# Patient Record
Sex: FEMALE | Employment: OTHER
[De-identification: names, ages, dates, MRNs, and addresses within clinical notes are randomized per-mention and may not be internally consistent; named-entity substitution may affect disease eponyms.]

---

## 2017-03-10 RX ORDER — METOPROLOL TARTRATE 100 MG/1
100 TABLET ORAL 2 TIMES DAILY
COMMUNITY
End: 2019-06-07

## 2017-03-13 ENCOUNTER — SURGERY (OUTPATIENT)
Age: 77
End: 2017-03-13

## 2017-03-13 ENCOUNTER — ANESTHESIA (OUTPATIENT)
Dept: ENDOSCOPY | Age: 77
End: 2017-03-13
Payer: MEDICARE

## 2017-03-13 ENCOUNTER — ANESTHESIA EVENT (OUTPATIENT)
Dept: ENDOSCOPY | Age: 77
End: 2017-03-13
Payer: MEDICARE

## 2017-03-13 ENCOUNTER — HOSPITAL ENCOUNTER (OUTPATIENT)
Age: 77
Setting detail: OUTPATIENT SURGERY
Discharge: HOME | End: 2017-03-13
Attending: SPECIALIST | Admitting: SPECIALIST
Payer: MEDICARE

## 2017-03-13 VITALS
DIASTOLIC BLOOD PRESSURE: 65 MMHG | TEMPERATURE: 97.7 F | RESPIRATION RATE: 27 BRPM | SYSTOLIC BLOOD PRESSURE: 116 MMHG | WEIGHT: 173 LBS | OXYGEN SATURATION: 97 % | HEART RATE: 72 BPM | BODY MASS INDEX: 31.64 KG/M2

## 2017-03-13 PROCEDURE — 77030009426 HC FCPS BIOP ENDOSC BSC -B: Performed by: SPECIALIST

## 2017-03-13 PROCEDURE — 88305 TISSUE EXAM BY PATHOLOGIST: CPT | Performed by: SPECIALIST

## 2017-03-13 PROCEDURE — 76060000031 HC ANESTHESIA FIRST 0.5 HR: Performed by: SPECIALIST

## 2017-03-13 PROCEDURE — 74011000250 HC RX REV CODE- 250

## 2017-03-13 PROCEDURE — 74011250636 HC RX REV CODE- 250/636

## 2017-03-13 PROCEDURE — 76040000019: Performed by: SPECIALIST

## 2017-03-13 RX ORDER — PROPOFOL 10 MG/ML
INJECTION, EMULSION INTRAVENOUS AS NEEDED
Status: DISCONTINUED | OUTPATIENT
Start: 2017-03-13 | End: 2017-03-13 | Stop reason: HOSPADM

## 2017-03-13 RX ORDER — FENTANYL CITRATE 50 UG/ML
200 INJECTION, SOLUTION INTRAMUSCULAR; INTRAVENOUS
Status: DISCONTINUED | OUTPATIENT
Start: 2017-03-13 | End: 2017-03-13 | Stop reason: HOSPADM

## 2017-03-13 RX ORDER — SODIUM CHLORIDE 0.9 % (FLUSH) 0.9 %
5-10 SYRINGE (ML) INJECTION AS NEEDED
Status: DISCONTINUED | OUTPATIENT
Start: 2017-03-13 | End: 2017-03-13 | Stop reason: HOSPADM

## 2017-03-13 RX ORDER — OMEPRAZOLE 20 MG/1
20 CAPSULE, DELAYED RELEASE ORAL DAILY
Qty: 30 CAP | Refills: 1 | Status: SHIPPED | OUTPATIENT
Start: 2017-03-13 | End: 2019-06-07

## 2017-03-13 RX ORDER — DEXTROMETHORPHAN/PSEUDOEPHED 2.5-7.5/.8
1.2 DROPS ORAL
Status: DISCONTINUED | OUTPATIENT
Start: 2017-03-13 | End: 2017-03-13 | Stop reason: HOSPADM

## 2017-03-13 RX ORDER — SODIUM CHLORIDE 9 MG/ML
50 INJECTION, SOLUTION INTRAVENOUS CONTINUOUS
Status: DISCONTINUED | OUTPATIENT
Start: 2017-03-13 | End: 2017-03-13 | Stop reason: HOSPADM

## 2017-03-13 RX ORDER — MIDAZOLAM HYDROCHLORIDE 1 MG/ML
.25-1 INJECTION, SOLUTION INTRAMUSCULAR; INTRAVENOUS
Status: DISCONTINUED | OUTPATIENT
Start: 2017-03-13 | End: 2017-03-13 | Stop reason: HOSPADM

## 2017-03-13 RX ORDER — SODIUM CHLORIDE 9 MG/ML
INJECTION, SOLUTION INTRAVENOUS
Status: DISCONTINUED | OUTPATIENT
Start: 2017-03-13 | End: 2017-03-13 | Stop reason: HOSPADM

## 2017-03-13 RX ORDER — SODIUM CHLORIDE 0.9 % (FLUSH) 0.9 %
5-10 SYRINGE (ML) INJECTION EVERY 8 HOURS
Status: DISCONTINUED | OUTPATIENT
Start: 2017-03-13 | End: 2017-03-13 | Stop reason: HOSPADM

## 2017-03-13 RX ORDER — LIDOCAINE HYDROCHLORIDE 20 MG/ML
INJECTION, SOLUTION EPIDURAL; INFILTRATION; INTRACAUDAL; PERINEURAL AS NEEDED
Status: DISCONTINUED | OUTPATIENT
Start: 2017-03-13 | End: 2017-03-13 | Stop reason: HOSPADM

## 2017-03-13 RX ORDER — FLUMAZENIL 0.1 MG/ML
0.2 INJECTION INTRAVENOUS
Status: DISCONTINUED | OUTPATIENT
Start: 2017-03-13 | End: 2017-03-13 | Stop reason: HOSPADM

## 2017-03-13 RX ORDER — EPINEPHRINE 0.1 MG/ML
1 INJECTION INTRACARDIAC; INTRAVENOUS
Status: DISCONTINUED | OUTPATIENT
Start: 2017-03-13 | End: 2017-03-13 | Stop reason: HOSPADM

## 2017-03-13 RX ORDER — NALOXONE HYDROCHLORIDE 0.4 MG/ML
0.4 INJECTION, SOLUTION INTRAMUSCULAR; INTRAVENOUS; SUBCUTANEOUS
Status: DISCONTINUED | OUTPATIENT
Start: 2017-03-13 | End: 2017-03-13 | Stop reason: HOSPADM

## 2017-03-13 RX ORDER — ATROPINE SULFATE 0.1 MG/ML
0.5 INJECTION INTRAVENOUS
Status: DISCONTINUED | OUTPATIENT
Start: 2017-03-13 | End: 2017-03-13 | Stop reason: HOSPADM

## 2017-03-13 RX ORDER — LORAZEPAM 2 MG/ML
2 INJECTION INTRAMUSCULAR AS NEEDED
Status: DISCONTINUED | OUTPATIENT
Start: 2017-03-13 | End: 2017-03-13 | Stop reason: HOSPADM

## 2017-03-13 RX ADMIN — PROPOFOL 20 MG: 10 INJECTION, EMULSION INTRAVENOUS at 10:35

## 2017-03-13 RX ADMIN — PROPOFOL 20 MG: 10 INJECTION, EMULSION INTRAVENOUS at 10:40

## 2017-03-13 RX ADMIN — PROPOFOL 20 MG: 10 INJECTION, EMULSION INTRAVENOUS at 10:37

## 2017-03-13 RX ADMIN — PROPOFOL 20 MG: 10 INJECTION, EMULSION INTRAVENOUS at 10:42

## 2017-03-13 RX ADMIN — PROPOFOL 50 MG: 10 INJECTION, EMULSION INTRAVENOUS at 10:34

## 2017-03-13 RX ADMIN — SODIUM CHLORIDE: 9 INJECTION, SOLUTION INTRAVENOUS at 10:22

## 2017-03-13 RX ADMIN — LIDOCAINE HYDROCHLORIDE 20 MG: 20 INJECTION, SOLUTION EPIDURAL; INFILTRATION; INTRACAUDAL; PERINEURAL at 10:34

## 2017-03-13 NOTE — ANESTHESIA PREPROCEDURE EVALUATION
Anesthetic History   No history of anesthetic complications            Review of Systems / Medical History  Patient summary reviewed, nursing notes reviewed and pertinent labs reviewed    Pulmonary  Within defined limits                 Neuro/Psych   Within defined limits           Cardiovascular  Within defined limits  Hypertension                   GI/Hepatic/Renal  Within defined limits              Endo/Other  Within defined limits  Diabetes         Other Findings              Physical Exam    Airway  Mallampati: II  TM Distance: > 6 cm  Neck ROM: normal range of motion   Mouth opening: Normal     Cardiovascular  Regular rate and rhythm,  S1 and S2 normal,  no murmur, click, rub, or gallop             Dental  No notable dental hx       Pulmonary  Breath sounds clear to auscultation               Abdominal  GI exam deferred       Other Findings            Anesthetic Plan    ASA: 2  Anesthesia type: MAC          Induction: Intravenous  Anesthetic plan and risks discussed with: Patient

## 2017-03-13 NOTE — IP AVS SNAPSHOT
[address] [facility] 245 [phone] Patient: Abdon Andujar MRN: [id number] RJT:[id number] You are allergic to the following Allergen Reactions Fosamax (Alendronate) Other (comments) Esophageal spasm/difficulty swallowing - possibility the medication caused this. Recent Documentation Weight BMI OB Status Smoking Status 78.5 kg 31.64 kg/m2 Postmenopausal Never Smoker Emergency Contacts Name Discharge Info Relation Home Work Mobile Tee Bentley  Child [2] [id number] About your hospitalization You were admitted on:  March 13, 2017 You last received care in the:  [facility] ENDOSCOPY You were discharged on:  March 13, 2017 Unit phone number:  [phone] Why you were hospitalized Your primary diagnosis was:  Not on File Providers Seen During Your Hospitalizations Provider Role Specialty Primary office phone Law Tellez MD Attending Provider Gastroenterology [phone] Your Primary Care Physician (PCP) Primary Care Physician Office Phone Office Fax Sherlean Boast [phone] Follow-up Information None Current Discharge Medication List  
  
START taking these medications Dose & Instructions Dispensing Information Comments Morning Noon Evening Bedtime  
 omeprazole 20 mg capsule Commonly known as:  PRILOSEC Your last dose was: Your next dose is: Other:  _________ Dose:  20 mg Take 1 Cap by mouth daily. Quantity:  30 Cap Refills:  1 CONTINUE these medications which have NOT CHANGED Dose & Instructions Dispensing Information Comments Morning Noon Evening Bedtime ALLEGRA 180 mg tablet Generic drug:  fexofenadine Your last dose was: Your next dose is: Other:  _________  Dose:  180 mg  
 Take 180 mg by mouth daily. Refills:  0  
     
   
   
   
  
 aspirin 81 mg tablet Your last dose was: Your next dose is: Other:  _________ Dose:  81 mg Take 81 mg by mouth daily. Refills:  0  
     
   
   
   
  
 BIOTIN PO Your last dose was: Your next dose is: Other:  _________ Dose:  5000 mcg Take 5,000 mcg by mouth two (2) times a day. Refills:  0  
     
   
   
   
  
 CALCIUM + D PO Your last dose was: Your next dose is: Other:  _________ Dose:  600 mg Take 600 mg by mouth two (2) times a day. 600mg/400 units Refills:  0  
     
   
   
   
  
 CALCIUM CITRATE + D PO Your last dose was: Your next dose is: Other:  _________ Take  by mouth. Takes one po once daily. Refills:  0  
     
   
   
   
  
 CRESTOR 20 mg tablet Generic drug:  rosuvastatin Your last dose was: Your next dose is: Other:  _________ Dose:  20 mg Take 20 mg by mouth daily. Refills:  0  
     
   
   
   
  
 finasteride 5 mg tablet Commonly known as:  PROSCAR Your last dose was: Your next dose is: Other:  _________ Dose:  5 mg Take 5 mg by mouth daily. Refills:  0  
     
   
   
   
  
 hydroCHLOROthiazide 25 mg tablet Commonly known as:  HYDRODIURIL Your last dose was: Your next dose is: Other:  _________ Dose:  25 mg Take 25 mg by mouth daily. Refills:  0 LOVAZA 1 gram capsule Generic drug:  omega-3 acid ethyl esters Your last dose was: Your next dose is: Other:  _________ Dose:  1 g Take 1 g by mouth two (2) times a day. Refills:  0  
     
   
   
   
  
 magnesium 250 mg Tab Your last dose was: Your next dose is: Other:  _________  Dose:  250 mg  
 Take 250 mg by mouth daily. Refills:  0  
     
   
   
   
  
 metoprolol tartrate 100 mg IR tablet Commonly known as:  LOPRESSOR Your last dose was: Your next dose is: Other:  _________ Dose:  100 mg Take 100 mg by mouth two (2) times a day. Refills:  0  
     
   
   
   
  
 OCUVITE PO Your last dose was: Your next dose is: Other:  _________ Take  by mouth. Takes one po twice daily. Refills:  0 Where to Get Your Medications Information on where to get these meds will be given to you by the nurse or doctor. ! Ask your nurse or doctor about these medications  
  omeprazole 20 mg capsule Discharge Instructions Charleen Bess 
[id number] 
1940 EGD DISCHARGE INSTRUCTIONS Discomfort: 
Sore throat- throat lozenges or warm salt water gargle 
redness at IV site- apply warm compress to area; if redness or soreness persist- contact your physician Gaseous discomfort- walking, belching will help relieve any discomfort You may not operate a vehicle for 12 hours You may not engage in an occupation involving machinery or appliances for rest of today. You may not drink alcoholic beverages for at least 12 hours Avoid making any critical decisions for at least 24 hour DIET You may resume your regular diet  however -  remember your colon is empty and a heavy meal will produce gas. Avoid these foods:  vegetables, fried / greasy foods, carbonated drinks ACTIVITY You may resume your normal daily activities. Spend the remainder of the day resting -  avoid any strenuous activity. CALL M.D. ANY SIGN OF Increasing pain, nausea, vomiting Abdominal distension (swelling) New increased bleeding (oral or rectal) Fever (chills) Pain in chest area Bloody discharge from nose or mouth Shortness of breath You may not take any Advil, Aspirin, Ibuprofen, Motrin, Aleve, or Goodys unless MD recommended, ONLY  Tylenol as needed for pain. Follow-up Instructions: 
 Call Dr. Katrina Alas office to make appointment for ongoing problems Office telephone for problems or questions [phone] Results of procedure / biopsy in 10-14 days  
-Acid suppression with a proton pump inhibitor take omeprazole for 30 days. , -Await pathology. , -Follow up with me for ongoing symptoms. , -No NSAIDS Discharge Orders None Introducing \[facility]\"" & HEALTH SERVICES! Dear Tucker Holley: 
Thank you for requesting a VUELOGIC account. Our records indicate that you already have an active VUELOGIC account. You can access your account anytime at [email or web address] Did you know that you can access your hospital and ER discharge instructions at any time in VUELOGIC? You can also review all of your test results from your hospital stay or ER visit. Additional Information If you have questions, please visit the Frequently Asked Questions section of the VUELOGIC website at [email or web address] Remember, VUELOGIC is NOT to be used for urgent needs. For medical emergencies, dial 911. Now available from your iPhone and Android! General Information Please provide this summary of care documentation to your next provider. Patient Signature:  ____________________________________________________________ Date:  ____________________________________________________________  
  
Yoel Barbosa Provider Signature:  ____________________________________________________________ Date:  ____________________________________________________________

## 2017-03-13 NOTE — DISCHARGE INSTRUCTIONS
Foy Osler  [id number]  1940    EGD DISCHARGE INSTRUCTIONS  Discomfort:  Sore throat- throat lozenges or warm salt water gargle  redness at IV site- apply warm compress to area; if redness or soreness persist- contact your physician  Gaseous discomfort- walking, belching will help relieve any discomfort  You may not operate a vehicle for 12 hours  You may not engage in an occupation involving machinery or appliances for rest of today. You may not drink alcoholic beverages for at least 12 hours  Avoid making any critical decisions for at least 24 hour  DIET  You may resume your regular diet - however -  remember your colon is empty and a heavy meal will produce gas. Avoid these foods:  vegetables, fried / greasy foods, carbonated drinks  ACTIVITY  You may resume your normal daily activities. Spend the remainder of the day resting -  avoid any strenuous activity. CALL M.D. ANY SIGN OF   Increasing pain, nausea, vomiting  Abdominal distension (swelling)  New increased bleeding (oral or rectal)  Fever (chills)  Pain in chest area  Bloody discharge from nose or mouth  Shortness of breath    You may not take any Advil, Aspirin, Ibuprofen, Motrin, Aleve, or Goodys unless MD recommended, ONLY  Tylenol as needed for pain. Follow-up Instructions:   Call Dr. Live Falling office to make appointment for ongoing problems  Office telephone for problems or questions [phone]  Results of procedure / biopsy in 10-14 days   -Acid suppression with a proton pump inhibitor take omeprazole for 30 days. , -Await pathology. , -Follow up with me for ongoing symptoms. , -No NSAIDS

## 2017-03-13 NOTE — PROCEDURES
EGD Procedure Note    Indications:  Dyphagia/odynophagia, GERD, Hx of Fosamax use   Referring Physician: Tai Edge MD   Anesthesia/Sedation:MAC  Endoscopist:  Dr. Rebecca Liu  Assistant:  Endoscopy Technician-1: Jains Son  Endoscopy RN-1: Xiomy Anders RN    Preoperative diagnosis: GERD    Postoperative diagnosis: Hiatal Hernia  Schatzki's Ring  Esophagitis      Procedure in Detail:  Informed consent was obtained for the procedure, including sedation. Risks of perforation, hemorrhage, adverse drug reaction, and aspiration were discussed. The patient was placed in the left lateral decubitus position. Based on the pre-procedure assessment, including review of the patient's medical history, medications, allergies, and review of systems, she had been deemed to be an appropriate candidate for moderate sedation; she was therefore sedated with the medications listed above. The patient was monitored continuously with ECG tracing, pulse oximetry, blood pressure monitoring, and direct observations. An Olympus video endoscope was inserted into the mouth and advanced under direct vision to into the esophagus, then stomach and duodenum. A careful inspection was made as the gastroscope was withdrawn, including a retroflexed view of the proximal stomach; findings and interventions are described below. Findings:   Esophagus:focal erosion at GEJ -Schatzki's ring(13 mm) - OTG Savory dilation -45-48 [place]  Stomach: 4 cm fixed hiatal hernia  Duodenum/jejunum: normal    Therapies:  See above    Specimens: See above           EBL: None    Complications:   None; patient tolerated the procedure well. Recommendations:  -Acid suppression with a proton pump inhibitor take omeprazole for 30 days. , -Await pathology. , -Follow up with me for ongoing symptoms. , -No NSAIDS    Katherin Mares MD

## 2017-03-13 NOTE — ROUTINE PROCESS
[facility]  1940  [id number]    Situation:  Verbal report received from: VERONICA Ingram RN  Procedure: Procedure(s):  ESOPHAGOGASTRODUODENOSCOPY (EGD)  ESOPHAGEAL DILATION  ESOPHAGOGASTRODUODENAL (EGD) BIOPSY    Background:    Preoperative diagnosis: GERD  Postoperative diagnosis: Hiatal Hernia  Schatzki's Ring  Esophagitis    :  Dr. Lane Abebe  Assistant(s): Endoscopy Technician-1: Chau Barrow  Endoscopy RN-1: Isauro Gardner RN    Specimens:   ID Type Source Tests Collected by Time Destination   1 : GE junction Preservative Esophagus, Distal  Jane Rowland MD 3/13/2017 1038 Pathology     H. Pylori  no    Assessment:  Intra-procedure medications     Anesthesia gave intra-procedure sedation and medications, see anesthesia flow sheet yes    Intravenous fluids: NS@ KVO     Vital signs stable     Abdominal assessment: round and soft     Recommendation:  Discharge patient per MD order.   Family or Friend   Permission to share finding with family or friend yes

## 2017-03-13 NOTE — ANESTHESIA POSTPROCEDURE EVALUATION
Post-Anesthesia Evaluation and Assessment    Patient: Melinda Mar MRN: [id number]  SSN: [id number]    YOB: 1940  Age: 64 y.o. Sex: female       Cardiovascular Function/Vital Signs  Visit Vitals    BP (!) 116/98    Pulse 80    Temp 37 °C (98.6 °F)    Resp 20    Wt 78.5 kg (173 lb)    SpO2 94%    BMI 31.64 kg/m2       Patient is status post MAC anesthesia for Procedure(s):  ESOPHAGOGASTRODUODENOSCOPY (EGD)  ESOPHAGEAL DILATION  ESOPHAGOGASTRODUODENAL (EGD) BIOPSY. Nausea/Vomiting: None    Postoperative hydration reviewed and adequate. Pain:  Pain Scale 1: Numeric (0 - 10) (03/13/17 1001)  Pain Intensity 1: 0 (03/13/17 1001)   Managed    Neurological Status: At baseline    Mental Status and Level of Consciousness: Arousable    Pulmonary Status:   O2 Device: Nasal cannula (03/13/17 1045)   Adequate oxygenation and airway patent    Complications related to anesthesia: None    Post-anesthesia assessment completed.  No concerns    Signed By: Darry Bumpers, MD     March 13, 2017

## 2017-08-30 ENCOUNTER — HOSPITAL ENCOUNTER (OUTPATIENT)
Dept: NON INVASIVE DIAGNOSTICS | Age: 77
Discharge: HOME | End: 2017-08-30
Payer: MEDICARE

## 2017-08-30 DIAGNOSIS — Z01.818 PRE-OP EXAMINATION: ICD-10-CM

## 2017-08-30 PROCEDURE — 93005 ELECTROCARDIOGRAM TRACING: CPT

## 2017-08-31 LAB
ATRIAL RATE: 63 BPM
CALCULATED P AXIS, ECG09: 65 DEGREES
CALCULATED R AXIS, ECG10: 21 DEGREES
CALCULATED T AXIS, ECG11: 43 DEGREES
DIAGNOSIS, 93000: NORMAL
P-R INTERVAL, ECG05: 198 MS
Q-T INTERVAL, ECG07: 402 MS
QRS DURATION, ECG06: 82 MS
QTC CALCULATION (BEZET), ECG08: 411 MS
VENTRICULAR RATE, ECG03: 63 BPM

## 2019-06-07 ENCOUNTER — APPOINTMENT (OUTPATIENT)
Dept: CT IMAGING | Age: 79
DRG: 872 | End: 2019-06-07
Attending: EMERGENCY MEDICINE
Payer: MEDICARE

## 2019-06-07 ENCOUNTER — HOSPITAL ENCOUNTER (INPATIENT)
Age: 79
LOS: 3 days | Discharge: HOME | DRG: 872 | End: 2019-06-10
Attending: EMERGENCY MEDICINE | Admitting: FAMILY MEDICINE
Payer: MEDICARE

## 2019-06-07 DIAGNOSIS — R50.9 FEVER, UNSPECIFIED FEVER CAUSE: ICD-10-CM

## 2019-06-07 DIAGNOSIS — R10.32 ABDOMINAL PAIN, LLQ (LEFT LOWER QUADRANT): Primary | ICD-10-CM

## 2019-06-07 PROBLEM — K52.9 COLITIS: Status: ACTIVE | Noted: 2019-06-07

## 2019-06-07 LAB
ALBUMIN SERPL-MCNC: 3.2 G/DL (ref 3.5–5)
ALBUMIN/GLOB SERPL: 0.8 {RATIO} (ref 1.1–2.2)
ALP SERPL-CCNC: 115 U/L (ref 45–117)
ALT SERPL-CCNC: 22 U/L (ref 12–78)
ANION GAP SERPL CALC-SCNC: 8 MMOL/L (ref 5–15)
APPEARANCE UR: CLEAR
AST SERPL-CCNC: 12 U/L (ref 15–37)
BASOPHILS # BLD: 0 K/UL (ref 0–0.1)
BASOPHILS NFR BLD: 0 % (ref 0–1)
BILIRUB SERPL-MCNC: 0.5 MG/DL (ref 0.2–1)
BILIRUB UR QL: NEGATIVE
BUN SERPL-MCNC: 25 MG/DL (ref 6–20)
BUN/CREAT SERPL: 28 (ref 12–20)
CALCIUM SERPL-MCNC: 9.2 MG/DL (ref 8.5–10.1)
CHLORIDE SERPL-SCNC: 106 MMOL/L (ref 97–108)
CO2 SERPL-SCNC: 24 MMOL/L (ref 21–32)
COLOR UR: NORMAL
COMMENT, HOLDF: NORMAL
CREAT SERPL-MCNC: 0.9 MG/DL (ref 0.55–1.02)
DIFFERENTIAL METHOD BLD: ABNORMAL
EOSINOPHIL # BLD: 0 K/UL (ref 0–0.4)
EOSINOPHIL NFR BLD: 0 % (ref 0–7)
ERYTHROCYTE [DISTWIDTH] IN BLOOD BY AUTOMATED COUNT: 14.7 % (ref 11.5–14.5)
EST. AVERAGE GLUCOSE BLD GHB EST-MCNC: 117 MG/DL
GLOBULIN SER CALC-MCNC: 3.8 G/DL (ref 2–4)
GLUCOSE BLD STRIP.AUTO-MCNC: 122 MG/DL (ref 65–100)
GLUCOSE BLD STRIP.AUTO-MCNC: 136 MG/DL (ref 65–100)
GLUCOSE SERPL-MCNC: 133 MG/DL (ref 65–100)
GLUCOSE UR STRIP.AUTO-MCNC: NEGATIVE MG/DL
HBA1C MFR BLD: 5.7 % (ref 4.2–6.3)
HCT VFR BLD AUTO: 39.1 % (ref 35–47)
HGB BLD-MCNC: 12.9 G/DL (ref 11.5–16)
HGB UR QL STRIP: NEGATIVE
IMM GRANULOCYTES # BLD AUTO: 0.3 K/UL
IMM GRANULOCYTES NFR BLD AUTO: 1 %
KETONES UR QL STRIP.AUTO: NEGATIVE MG/DL
LACTATE SERPL-SCNC: 1.7 MMOL/L (ref 0.4–2)
LACTATE SERPL-SCNC: 2.2 MMOL/L (ref 0.4–2)
LACTATE SERPL-SCNC: 2.5 MMOL/L (ref 0.4–2)
LEUKOCYTE ESTERASE UR QL STRIP.AUTO: NEGATIVE
LIPASE SERPL-CCNC: 81 U/L (ref 73–393)
LYMPHOCYTES # BLD: 1.1 K/UL (ref 0.8–3.5)
LYMPHOCYTES NFR BLD: 4 % (ref 12–49)
MCH RBC QN AUTO: 27.4 PG (ref 26–34)
MCHC RBC AUTO-ENTMCNC: 33 G/DL (ref 30–36.5)
MCV RBC AUTO: 83.2 FL (ref 80–99)
MONOCYTES # BLD: 0.9 K/UL (ref 0–1)
MONOCYTES NFR BLD: 3 % (ref 5–13)
NEUTS SEG # BLD: 26.4 K/UL (ref 1.8–8)
NEUTS SEG NFR BLD: 92 % (ref 32–75)
NITRITE UR QL STRIP.AUTO: NEGATIVE
NRBC # BLD: 0 K/UL (ref 0–0.01)
NRBC BLD-RTO: 0 PER 100 WBC
PH UR STRIP: 6.5 [PH] (ref 5–8)
PLATELET # BLD AUTO: 328 K/UL (ref 150–400)
PMV BLD AUTO: 9.8 FL (ref 8.9–12.9)
POTASSIUM SERPL-SCNC: 3.4 MMOL/L (ref 3.5–5.1)
PROT SERPL-MCNC: 7 G/DL (ref 6.4–8.2)
PROT UR STRIP-MCNC: NEGATIVE MG/DL
RBC # BLD AUTO: 4.7 M/UL (ref 3.8–5.2)
RBC MORPH BLD: ABNORMAL
SAMPLES BEING HELD,HOLD: NORMAL
SERVICE CMNT-IMP: ABNORMAL
SERVICE CMNT-IMP: ABNORMAL
SODIUM SERPL-SCNC: 138 MMOL/L (ref 136–145)
SP GR UR REFRACTOMETRY: <1.005 (ref 1–1.03)
TROPONIN I SERPL-MCNC: <0.05 NG/ML
UR CULT HOLD, URHOLD: NORMAL
UROBILINOGEN UR QL STRIP.AUTO: 0.2 EU/DL (ref 0.2–1)
WBC # BLD AUTO: 28.7 K/UL (ref 3.6–11)

## 2019-06-07 PROCEDURE — 85025 COMPLETE CBC W/AUTO DIFF WBC: CPT

## 2019-06-07 PROCEDURE — 74177 CT ABD & PELVIS W/CONTRAST: CPT

## 2019-06-07 PROCEDURE — 94760 N-INVAS EAR/PLS OXIMETRY 1: CPT

## 2019-06-07 PROCEDURE — 81003 URINALYSIS AUTO W/O SCOPE: CPT

## 2019-06-07 PROCEDURE — 83036 HEMOGLOBIN GLYCOSYLATED A1C: CPT

## 2019-06-07 PROCEDURE — 74011636320 HC RX REV CODE- 636/320: Performed by: EMERGENCY MEDICINE

## 2019-06-07 PROCEDURE — 65660000000 HC RM CCU STEPDOWN

## 2019-06-07 PROCEDURE — 74011250637 HC RX REV CODE- 250/637: Performed by: EMERGENCY MEDICINE

## 2019-06-07 PROCEDURE — 36415 COLL VENOUS BLD VENIPUNCTURE: CPT

## 2019-06-07 PROCEDURE — 94761 N-INVAS EAR/PLS OXIMETRY MLT: CPT

## 2019-06-07 PROCEDURE — 74011000250 HC RX REV CODE- 250: Performed by: FAMILY MEDICINE

## 2019-06-07 PROCEDURE — 80053 COMPREHEN METABOLIC PANEL: CPT

## 2019-06-07 PROCEDURE — 83690 ASSAY OF LIPASE: CPT

## 2019-06-07 PROCEDURE — 74011000258 HC RX REV CODE- 258: Performed by: EMERGENCY MEDICINE

## 2019-06-07 PROCEDURE — 99285 EMERGENCY DEPT VISIT HI MDM: CPT

## 2019-06-07 PROCEDURE — 87040 BLOOD CULTURE FOR BACTERIA: CPT

## 2019-06-07 PROCEDURE — 83605 ASSAY OF LACTIC ACID: CPT

## 2019-06-07 PROCEDURE — 74011250636 HC RX REV CODE- 250/636: Performed by: EMERGENCY MEDICINE

## 2019-06-07 PROCEDURE — 84484 ASSAY OF TROPONIN QUANT: CPT

## 2019-06-07 PROCEDURE — 96365 THER/PROPH/DIAG IV INF INIT: CPT

## 2019-06-07 PROCEDURE — 74011250636 HC RX REV CODE- 250/636: Performed by: FAMILY MEDICINE

## 2019-06-07 PROCEDURE — 82962 GLUCOSE BLOOD TEST: CPT

## 2019-06-07 PROCEDURE — C9113 INJ PANTOPRAZOLE SODIUM, VIA: HCPCS | Performed by: FAMILY MEDICINE

## 2019-06-07 RX ORDER — SODIUM CHLORIDE 9 MG/ML
75 INJECTION, SOLUTION INTRAVENOUS CONTINUOUS
Status: DISCONTINUED | OUTPATIENT
Start: 2019-06-07 | End: 2019-06-08

## 2019-06-07 RX ORDER — METOPROLOL SUCCINATE 50 MG/1
100 TABLET, EXTENDED RELEASE ORAL DAILY
Status: DISCONTINUED | OUTPATIENT
Start: 2019-06-08 | End: 2019-06-10 | Stop reason: HOSPADM

## 2019-06-07 RX ORDER — MAGNESIUM SULFATE 100 %
4 CRYSTALS MISCELLANEOUS AS NEEDED
Status: DISCONTINUED | OUTPATIENT
Start: 2019-06-07 | End: 2019-06-09

## 2019-06-07 RX ORDER — MULTIVITAMIN
1 TABLET ORAL DAILY
COMMUNITY

## 2019-06-07 RX ORDER — SODIUM CHLORIDE 0.9 % (FLUSH) 0.9 %
5-40 SYRINGE (ML) INJECTION EVERY 8 HOURS
Status: DISCONTINUED | OUTPATIENT
Start: 2019-06-07 | End: 2019-06-10 | Stop reason: HOSPADM

## 2019-06-07 RX ORDER — INSULIN LISPRO 100 [IU]/ML
INJECTION, SOLUTION INTRAVENOUS; SUBCUTANEOUS EVERY 6 HOURS
Status: DISCONTINUED | OUTPATIENT
Start: 2019-06-07 | End: 2019-06-09

## 2019-06-07 RX ORDER — POTASSIUM CHLORIDE 7.45 MG/ML
10 INJECTION INTRAVENOUS ONCE
Status: COMPLETED | OUTPATIENT
Start: 2019-06-07 | End: 2019-06-07

## 2019-06-07 RX ORDER — METOPROLOL SUCCINATE 100 MG/1
100 TABLET, EXTENDED RELEASE ORAL DAILY
COMMUNITY

## 2019-06-07 RX ORDER — SODIUM CHLORIDE 0.9 % (FLUSH) 0.9 %
5-40 SYRINGE (ML) INJECTION AS NEEDED
Status: DISCONTINUED | OUTPATIENT
Start: 2019-06-07 | End: 2019-06-10 | Stop reason: HOSPADM

## 2019-06-07 RX ORDER — HEPARIN SODIUM 5000 [USP'U]/ML
5000 INJECTION, SOLUTION INTRAVENOUS; SUBCUTANEOUS EVERY 8 HOURS
Status: DISCONTINUED | OUTPATIENT
Start: 2019-06-07 | End: 2019-06-10 | Stop reason: HOSPADM

## 2019-06-07 RX ORDER — GUAIFENESIN 100 MG/5ML
81 LIQUID (ML) ORAL DAILY
Status: DISCONTINUED | OUTPATIENT
Start: 2019-06-08 | End: 2019-06-10 | Stop reason: HOSPADM

## 2019-06-07 RX ORDER — FINASTERIDE 5 MG/1
5 TABLET, FILM COATED ORAL DAILY
Status: DISCONTINUED | OUTPATIENT
Start: 2019-06-08 | End: 2019-06-09

## 2019-06-07 RX ORDER — SODIUM CHLORIDE 0.9 % (FLUSH) 0.9 %
10 SYRINGE (ML) INJECTION
Status: COMPLETED | OUTPATIENT
Start: 2019-06-07 | End: 2019-06-07

## 2019-06-07 RX ORDER — ACETAMINOPHEN 325 MG/1
650 TABLET ORAL
Status: COMPLETED | OUTPATIENT
Start: 2019-06-07 | End: 2019-06-07

## 2019-06-07 RX ORDER — ROSUVASTATIN CALCIUM 10 MG/1
20 TABLET, COATED ORAL DAILY
Status: DISCONTINUED | OUTPATIENT
Start: 2019-06-08 | End: 2019-06-10 | Stop reason: HOSPADM

## 2019-06-07 RX ORDER — ACETAMINOPHEN 325 MG/1
650 TABLET ORAL
Status: DISCONTINUED | OUTPATIENT
Start: 2019-06-07 | End: 2019-06-10 | Stop reason: HOSPADM

## 2019-06-07 RX ORDER — DEXTROSE 50 % IN WATER (D50W) INTRAVENOUS SYRINGE
25-50 AS NEEDED
Status: DISCONTINUED | OUTPATIENT
Start: 2019-06-07 | End: 2019-06-09

## 2019-06-07 RX ADMIN — POTASSIUM CHLORIDE 10 MEQ: 10 INJECTION, SOLUTION INTRAVENOUS at 18:41

## 2019-06-07 RX ADMIN — PIPERACILLIN AND TAZOBACTAM 3.38 G: 3; .375 INJECTION, POWDER, FOR SOLUTION INTRAVENOUS at 13:48

## 2019-06-07 RX ADMIN — SODIUM CHLORIDE 75 ML/HR: 900 INJECTION, SOLUTION INTRAVENOUS at 16:43

## 2019-06-07 RX ADMIN — PIPERACILLIN AND TAZOBACTAM 3.38 G: 3; .375 INJECTION, POWDER, FOR SOLUTION INTRAVENOUS at 21:30

## 2019-06-07 RX ADMIN — HEPARIN SODIUM 5000 UNITS: 5000 INJECTION INTRAVENOUS; SUBCUTANEOUS at 18:50

## 2019-06-07 RX ADMIN — Medication 10 ML: at 12:40

## 2019-06-07 RX ADMIN — SODIUM CHLORIDE 100 ML: 900 INJECTION, SOLUTION INTRAVENOUS at 12:40

## 2019-06-07 RX ADMIN — PANTOPRAZOLE SODIUM 40 MG: 40 INJECTION, POWDER, FOR SOLUTION INTRAVENOUS at 18:41

## 2019-06-07 RX ADMIN — IOPAMIDOL 100 ML: 755 INJECTION, SOLUTION INTRAVENOUS at 12:40

## 2019-06-07 RX ADMIN — ACETAMINOPHEN 650 MG: 325 TABLET ORAL at 10:59

## 2019-06-07 RX ADMIN — SODIUM CHLORIDE 1000 ML: 900 INJECTION, SOLUTION INTRAVENOUS at 10:59

## 2019-06-07 NOTE — ED TRIAGE NOTES
Triage Note: Patient is coming in with nausea and vomiting for the past 2 weeks intermittent. Patient started with fever this morning.

## 2019-06-07 NOTE — PROGRESS NOTES
Problem: Falls - Risk of  Goal: *Absence of Falls  Description  Document Jeanne Lopez Fall Risk and appropriate interventions in the flowsheet.   Outcome: Progressing Towards Goal     Problem: Patient Education: Go to Patient Education Activity  Goal: Patient/Family Education  Outcome: Progressing Towards Goal

## 2019-06-07 NOTE — ROUTINE PROCESS
TRANSFER - IN REPORT: 
 
Verbal report received from Rosa(name) on Breezy Ferris  being received from ED(unit) for routine progression of care Report consisted of patients Situation, Background, Assessment and  
Recommendations(SBAR). Information from the following report(s) SBAR, Kardex, Intake/Output, MAR and Recent Results was reviewed with the receiving nurse. Opportunity for questions and clarification was provided. Assessment completed upon patients arrival to unit and care assumed.

## 2019-06-07 NOTE — ED PROVIDER NOTES
[de-identified] y.o. female with past medical history significant for appendicitis, htn, dm, Meniere's dz, osteoporosis, gerd, breast ca, who presents from home with chief complaint of abd pain. Pt has 2 weeks onset of intermittent abd pain located diffusely in the LLQ, stating \"it's in my lower intestine. \" Her pain initially resolved on its own, but came back last night and was accompanied by multiple episodes of vomiting, cough, and fever (peak 101.3 this AM). Since then, pt has developed a subsequent HA and felt dehydrated. No medications taken. Pt consulted with GI and was referred to the ED for an abd CT. Other complaints include mild, positional right sided chest ache of 2 days. Denies diarrhea, bloody stools, urinary sx, and leg edema. Abd SGHx includes appendectomy. Passing loose BMs. There are no other acute medical concerns at this time. PCP: Lani Gilbert MD    Note written by Natalee Mariscal, as dictated by Karl Murillo MD 10:20 AM    The history is provided by the patient. No  was used.         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       Past Surgical History:   Procedure Laterality Date    BREAST SURGERY PROCEDURE UNLISTED      cyst removed from left breast    HX APPENDECTOMY      HX BREAST BIOPSY Bilateral     x 6 - one cancerous    HX BREAST LUMPECTOMY      left    HX HEENT      T & A    HX OTHER SURGICAL      colonoscopy x 1 - normal per pt    HX VASCULAR ACCESS      port-a-cath in/out         Family History: Problem Relation Age of Onset    Breast Cancer Mother     Heart Disease Father        Social History     Socioeconomic History    Marital status:      Spouse name: Not on file    Number of children: Not on file    Years of education: Not on file    Highest education level: Not on file   Occupational History    Not on file   Social Needs    Financial resource strain: Not on file    Food insecurity:     Worry: Not on file     Inability: Not on file    Transportation needs:     Medical: Not on file     Non-medical: Not on file   Tobacco Use    Smoking status: Never Smoker    Smokeless tobacco: Never Used   Substance and Sexual Activity    Alcohol use: Yes     Comment: very seldom    Drug use: Not on file    Sexual activity: Not on file   Lifestyle    Physical activity:     Days per week: Not on file     Minutes per session: Not on file    Stress: Not on file   Relationships    Social connections:     Talks on phone: Not on file     Gets together: Not on file     Attends Religion service: Not on file     Active member of club or organization: Not on file     Attends meetings of clubs or organizations: Not on file     Relationship status: Not on file    Intimate partner violence:     Fear of current or ex partner: Not on file     Emotionally abused: Not on file     Physically abused: Not on file     Forced sexual activity: Not on file   Other Topics Concern    Not on file   Social History Narrative    Not on file         ALLERGIES: Fosamax [alendronate]    Review of Systems   Constitutional: Positive for fever. Negative for chills. HENT: Negative for rhinorrhea. Eyes: Negative for discharge. Respiratory: Positive for cough. Negative for shortness of breath. Cardiovascular: Positive for chest pain. Negative for leg swelling. Gastrointestinal: Positive for abdominal pain, nausea and vomiting. Negative for blood in stool, constipation and diarrhea.    Endocrine: Negative for polyuria. Genitourinary: Negative for dysuria and hematuria. Skin: Negative for rash. Allergic/Immunologic: Negative for immunocompromised state. Neurological: Positive for headaches. Vitals:    06/07/19 1007 06/07/19 1025   BP:  137/69   Pulse: (!) 128 (!) 109   Resp:  20   Temp:  98.1 °F (36.7 °C)   SpO2: 92% 94%   Weight:  80.2 kg (176 lb 12.9 oz)   Height:  5' 2\" (1.575 m)            Physical Exam   Constitutional: She appears well-developed and well-nourished. HENT:   Head: Normocephalic and atraumatic. Mouth/Throat: Oropharynx is clear and moist. Mucous membranes are dry. Eyes: EOM are normal.   Neck: Normal range of motion. Cardiovascular: Regular rhythm. Tachycardia present. Pulses:       Radial pulses are 2+ on the right side, and 2+ on the left side. Pulmonary/Chest: Effort normal and breath sounds normal.   Abdominal: She exhibits no distension. There is tenderness (mild) in the left lower quadrant. There is no rebound and no guarding. Musculoskeletal: She exhibits no edema (BLE). Neurological: She is alert. Skin: Skin is warm and dry. Psychiatric: She has a normal mood and affect. Note written by Natalee Griffiths, as dictated by Nevaeh Oakes MD 10:20 AM  MDM  Number of Diagnoses or Management Options  Abdominal pain, LLQ (left lower quadrant):   Fever, unspecified fever cause:   Diagnosis management comments: [de-identified] F with hx of appendicitis, htn, dm, Meniere's dz, osteoporosis, gerd, breast ca presenting with abdominal pain, fever, vomiting. She is afebrile on exam, normotensive, nontoxic appearing. Abd exam is nonperitoneal. Concern for intraabdominal infection, diverticulitis, obstuction, UTI, pyleonephritis, sepsis. Labs show significant leukocytosis of 24, lactate of 3.2. Given 1L IVF, zosyn empirically pending CT scan. Rpt Lactate ordered. CT shows thickening of sigmoid colon c/w colitis v. Malignancy v. Chronic inflammatory changes.  Given clinical picture I suspect infectious etiology, will admit to medicine for con't abx and further management. Hospitalist Kirstie for Admission  2:23 PM    ED Room Number: ER15/15  Patient Name and age:   Clem Coleman [de-identified] y.o.  female  Working Diagnosis: Abdominal pain, LLQ (left lower quadrant)  (primary encounter diagnosis)  Fever, unspecified fever cause  Readmission: no  Isolation Requirements:  no  Recommended Level of Care:  med/surg  Code Status:  none  Other:  none           Procedures

## 2019-06-07 NOTE — H&P
[address]  HISTORY AND PHYSICAL    Name:  Nixon Rasheed  MR#:  [id number]  :  1940  ACCOUNT #:  [de-identified]  ADMIT DATE:  2019      CHIEF COMPLAINT:  Abdominal pain. HISTORY OF PRESENT ILLNESS:  The patient is an 26-year-old female with past medical history of appendicitis, hypertension, diabetes mellitus type 2, Meniere's disease, osteoporosis, GERD, breast cancer, who presents to the hospital with the above-mentioned symptom. History was primarily obtained from the patient. The patient reports that about 2 weeks back, she had sudden onset of sharp lower abdominal pain, especially situated on her left side. The patient reports that she had some irregular bowel movements associated with that, which were loose, went to her primary care physician, was told to go to a gastroenterologist, but she was not started on any medications or any imaging done. The patient reports symptoms somewhat got better, but she never got completely better, and about 2 days back, the pain came back. The patient reports the pain continued to be in the left lower quadrant. She also had some nausea and this morning had a fever of 101.3 degrees Fahrenheit. The patient reports that she also had a headache, felt dehydrated, got concerned, and decided to come to the ER. In the ER, the patient had an abnormal CT scan and was requested to be admitted under the hospitalist service. The patient also reports that she has been having some mild chest pain but attributes to her acid reflux. The patient denies any palpitations, diaphoresis, or shortness of breath associated with her chest pain.   The patient currently denies any headache, blurry vision, sore throat, trouble swallowing, trouble with speech, denies any chest pain currently, any shortness of breath, cough, urinary symptoms, focal or generalized neurological weakness, recent travel, sick contacts, falls, injuries, hematemesis, melena, hemoptysis, hematuria, or any other concerns or problems. PAST MEDICAL HISTORY:  See above. HOME MEDICATIONS:  The patient is on:  1. Toprol 100 mg daily. 2.  Caltrate one tablet daily. 3.   daily. 4.  Biotin. 5.  Crestor 20 mg daily. 6.  Hydrochlorothiazide 25 mg daily. 7.  Aspirin 81 mg daily. 8.  Magnesium 250 mg daily. SOCIAL HISTORY:   Denies tobacco abuse. Occasional alcohol. Denies IV drug abuse. ALLERGIES:  TO FOSAMAX. REVIEW OF SYMPTOMS:  All systems were reviewed and found to be essentially negative except for the symptoms mentioned above. FAMILY HISTORY:  Mother has a history of breast cancer and father has a history of heart disease. PHYSICAL EXAMINATION:  GENERAL:  Alert x3, awake. No acute distress, resting in bed, pleasant female, appears to be stated age. VITAL SIGNS:  Temperature 97.7, pulse 87, respiratory rate 20, blood pressure 114/60, pulse ox 94% on room air. HEENT:  Pupils equal and reactive to light. Dry mucous membranes. Tympanic membranes clear. NECK:  Supple. CHEST:  Clear to auscultation bilaterally. CORONARY:  S1 and S2 are heard. ABDOMEN:  Soft. Tender to palpation in the pelvis and left lower quadrant region. No rebound. No guarding. Bowel sounds are hyperactive. EXTREMITIES:  No clubbing, no cyanosis, no edema. NEURO/PSYCH:  Pleasant mood and affect. Cranial nerves II-XII grossly intact. Sensory grossly within normal limits. DTRs 2+ x4. Strength 5/5. SKIN:  Warm. LABORATORY DATA:  White count 28.7, hemoglobin 12.9, hematocrit 39.1, platelets 847. Urine shows no signs of infection. Sodium 138, potassium 3.4, chloride 106, bicarbonate 24, anion gap 8, glucose 133, BUN 25, creatinine 0.90, calcium 9.2, BUN/creatinine ratio is 28. Bilirubin total 0.5, ALT 22, AST 12, alkaline phosphatase 115, lipase 81, lactic 2.2. Blood cultures are pending.     CT of the abdomen and pelvis shows a patchy lingular airspace disease, may represent pneumonia, chronic infection, atelectasis. Correlation with fever, leukocytosis, etc., is recommended. Indeterminate, relatively long segment, circumferential mid sigmoid colonic wall thickening, underdistention or chronic inflammation are favored over malignancy. Followup CT  colonoscopy may be indicated. ASSESSMENT AND PLAN:  1. Abdominal pain, likely secondary to infectious colitis. The patient will be admitted on telemetry bed. This is supported by elevated white count, fever, chills, and systemic inflammatory response syndrome like picture. We will start the patient on clear liquid diet, IV hydration, pain control, IV antibiotics. We will get GI consult and further intervention per hospital course. The patient has some gastroesophageal reflux disease symptoms. We will also add Protonix. Reassess as needed. Continue to closely monitor. 2.  Chest pain, likely gastroesophageal reflux disease, which is musculoskeletal.  Less likely cardiac. Continue aspirin, Protonix. Cycle troponins. Telemetry monitoring. Further intervention per hospital course. Reassess as needed. We will get an echocardiogram.  If persists, may consider getting a Cardiology consult. Continue to closely monitor. 3.  Leucocytosis, likely secondary to #1. Continue antibiotics. Continue to monitor. 4.  Lactic acidosis, likely secondary to dehydration and #1. I will provide IV hydration. Repeat lactate in 4 hours. Continue to closely monitor. The patient is on broad-spectrum IV antibiotics. Blood cultures drawn in the ER. 5.  Dehydration. IV hydration and repeat labs in the morning. 6.  Hypokalemia. We will replace potassium. 7.  Gastrointestinal and deep venous thrombosis prophylaxis. The patient will be on heparin.       Raudel Romreo MD      [id number]  D:  06/07/2019 16:07  T:  06/07/2019 17:40  JOB #:  [id number]

## 2019-06-07 NOTE — ROUTINE PROCESS
TRANSFER - OUT REPORT: 
 
Verbal report given to Sam Deras RN and Klaudia Chopra RN (name) on Melecio Schmitt  being transferred to  (unit) for routine progression of care Report consisted of patients Situation, Background, Assessment and  
Recommendations(SBAR). Information from the following report(s) SBAR, Kardex, ED Summary, Intake/Output, MAR and Recent Results was reviewed with the receiving nurse. Lines:  
Peripheral IV Right Antecubital (Active) Site Assessment Clean, dry, & intact 6/7/2019 10:53 AM  
Phlebitis Assessment 0 6/7/2019 10:53 AM  
Infiltration Assessment 0 6/7/2019 10:53 AM  
Dressing Status Clean, dry, & intact 6/7/2019 10:53 AM  
Dressing Type Tape;Transparent 6/7/2019 10:53 AM  
Hub Color/Line Status Pink;Flushed;Patent 6/7/2019 10:53 AM  
Action Taken Blood drawn 6/7/2019 10:53 AM  
Alcohol Cap Used No 6/7/2019 10:53 AM  
   
Peripheral IV 06/07/19 Right Hand (Active) Site Assessment Clean, dry, & intact 6/7/2019  1:34 PM  
Phlebitis Assessment 0 6/7/2019  1:34 PM  
Infiltration Assessment 0 6/7/2019  1:34 PM  
Dressing Type Tape;Transparent 6/7/2019  1:34 PM  
Hub Color/Line Status Pink;Capped;Flushed;Patent 6/7/2019  1:34 PM  
Action Taken Blood drawn 6/7/2019  1:34 PM  
  
 
Opportunity for questions and clarification was provided. Patient transported with: 
Transport

## 2019-06-08 ENCOUNTER — APPOINTMENT (OUTPATIENT)
Dept: GENERAL RADIOLOGY | Age: 79
DRG: 872 | End: 2019-06-08
Attending: FAMILY MEDICINE
Payer: MEDICARE

## 2019-06-08 ENCOUNTER — APPOINTMENT (OUTPATIENT)
Dept: NON INVASIVE DIAGNOSTICS | Age: 79
DRG: 872 | End: 2019-06-08
Attending: FAMILY MEDICINE
Payer: MEDICARE

## 2019-06-08 LAB
ANION GAP SERPL CALC-SCNC: 8 MMOL/L (ref 5–15)
BASOPHILS # BLD: 0.1 K/UL (ref 0–0.1)
BASOPHILS NFR BLD: 0 % (ref 0–1)
BUN SERPL-MCNC: 16 MG/DL (ref 6–20)
BUN/CREAT SERPL: 21 (ref 12–20)
CALCIUM SERPL-MCNC: 8.2 MG/DL (ref 8.5–10.1)
CHLORIDE SERPL-SCNC: 112 MMOL/L (ref 97–108)
CO2 SERPL-SCNC: 23 MMOL/L (ref 21–32)
CREAT SERPL-MCNC: 0.76 MG/DL (ref 0.55–1.02)
DIFFERENTIAL METHOD BLD: ABNORMAL
ECHO AV AREA PEAK VELOCITY: 2.5 CM2
ECHO AV PEAK GRADIENT: 6.3 MMHG
ECHO AV PEAK VELOCITY: 125.5 CM/S
ECHO EST RA PRESSURE: 5 MMHG
ECHO IVC SNIFF: 2.17 CM
ECHO LA AREA 4C: 20.9 CM2
ECHO LA VOL 2C: 51.56 ML (ref 22–52)
ECHO LA VOL 4C: 67.45 ML (ref 22–52)
ECHO LA VOL BP: 63.17 ML (ref 22–52)
ECHO LA VOL/BSA BIPLANE: 34.81 ML/M2 (ref 16–28)
ECHO LA VOLUME INDEX A2C: 28.41 ML/M2 (ref 16–28)
ECHO LA VOLUME INDEX A4C: 37.17 ML/M2 (ref 16–28)
ECHO LV E' LATERAL VELOCITY: 6.61 CM/S
ECHO LV E' SEPTAL VELOCITY: 8.43 CM/S
ECHO LV INTERNAL DIMENSION DIASTOLIC: 3.72 CM (ref 3.9–5.3)
ECHO LV INTERNAL DIMENSION SYSTOLIC: 2.37 CM
ECHO LV IVSD: 0.93 CM (ref 0.6–0.9)
ECHO LV MASS 2D: 97.7 G (ref 67–162)
ECHO LV MASS INDEX 2D: 53.8 G/M2 (ref 43–95)
ECHO LV POSTERIOR WALL DIASTOLIC: 0.76 CM (ref 0.6–0.9)
ECHO LVOT DIAM: 1.84 CM
ECHO LVOT PEAK GRADIENT: 5.7 MMHG
ECHO LVOT PEAK VELOCITY: 119.63 CM/S
ECHO MV A VELOCITY: 103.53 CM/S
ECHO MV AREA PHT: 3.5 CM2
ECHO MV E DECELERATION TIME (DT): 217.9 MS
ECHO MV E VELOCITY: 121.44 CM/S
ECHO MV E/A RATIO: 1.17
ECHO MV E/E' LATERAL: 18.37
ECHO MV E/E' RATIO (AVERAGED): 16.39
ECHO MV E/E' SEPTAL: 14.41
ECHO MV PRESSURE HALF TIME (PHT): 63.2 MS
ECHO PULMONARY ARTERY SYSTOLIC PRESSURE (PASP): 32.2 MMHG
ECHO RIGHT VENTRICULAR SYSTOLIC PRESSURE (RVSP): 32.2 MMHG
ECHO RV INTERNAL DIMENSION: 3.31 CM
ECHO RVOT PEAK VELOCITY: 65.9 CM/S
ECHO TV REGURGITANT MAX VELOCITY: 260.89 CM/S
ECHO TV REGURGITANT PEAK GRADIENT: 27.2 MMHG
EOSINOPHIL # BLD: 0.2 K/UL (ref 0–0.4)
EOSINOPHIL NFR BLD: 1 % (ref 0–7)
ERYTHROCYTE [DISTWIDTH] IN BLOOD BY AUTOMATED COUNT: 15 % (ref 11.5–14.5)
ERYTHROCYTE [SEDIMENTATION RATE] IN BLOOD: 43 MM/HR (ref 0–30)
GLUCOSE BLD STRIP.AUTO-MCNC: 116 MG/DL (ref 65–100)
GLUCOSE BLD STRIP.AUTO-MCNC: 120 MG/DL (ref 65–100)
GLUCOSE BLD STRIP.AUTO-MCNC: 124 MG/DL (ref 65–100)
GLUCOSE BLD STRIP.AUTO-MCNC: 129 MG/DL (ref 65–100)
GLUCOSE SERPL-MCNC: 111 MG/DL (ref 65–100)
HCT VFR BLD AUTO: 33.5 % (ref 35–47)
HGB BLD-MCNC: 10.8 G/DL (ref 11.5–16)
IMM GRANULOCYTES # BLD AUTO: 0.1 K/UL (ref 0–0.04)
IMM GRANULOCYTES NFR BLD AUTO: 1 % (ref 0–0.5)
LACTATE SERPL-SCNC: 1.1 MMOL/L (ref 0.4–2)
LYMPHOCYTES # BLD: 2.5 K/UL (ref 0.8–3.5)
LYMPHOCYTES NFR BLD: 13 % (ref 12–49)
MAGNESIUM SERPL-MCNC: 1.9 MG/DL (ref 1.6–2.4)
MCH RBC QN AUTO: 27 PG (ref 26–34)
MCHC RBC AUTO-ENTMCNC: 32.2 G/DL (ref 30–36.5)
MCV RBC AUTO: 83.8 FL (ref 80–99)
MONOCYTES # BLD: 0.9 K/UL (ref 0–1)
MONOCYTES NFR BLD: 5 % (ref 5–13)
NEUTS SEG # BLD: 15.2 K/UL (ref 1.8–8)
NEUTS SEG NFR BLD: 80 % (ref 32–75)
NRBC # BLD: 0 K/UL (ref 0–0.01)
NRBC BLD-RTO: 0 PER 100 WBC
PLATELET # BLD AUTO: 290 K/UL (ref 150–400)
PMV BLD AUTO: 9.9 FL (ref 8.9–12.9)
POTASSIUM SERPL-SCNC: 3 MMOL/L (ref 3.5–5.1)
POTASSIUM SERPL-SCNC: 3.6 MMOL/L (ref 3.5–5.1)
RBC # BLD AUTO: 4 M/UL (ref 3.8–5.2)
SERVICE CMNT-IMP: ABNORMAL
SODIUM SERPL-SCNC: 143 MMOL/L (ref 136–145)
WBC # BLD AUTO: 19 K/UL (ref 3.6–11)

## 2019-06-08 PROCEDURE — 82962 GLUCOSE BLOOD TEST: CPT

## 2019-06-08 PROCEDURE — 74011250637 HC RX REV CODE- 250/637: Performed by: FAMILY MEDICINE

## 2019-06-08 PROCEDURE — 65660000000 HC RM CCU STEPDOWN

## 2019-06-08 PROCEDURE — 85652 RBC SED RATE AUTOMATED: CPT

## 2019-06-08 PROCEDURE — 74011000258 HC RX REV CODE- 258: Performed by: EMERGENCY MEDICINE

## 2019-06-08 PROCEDURE — 74011000258 HC RX REV CODE- 258: Performed by: FAMILY MEDICINE

## 2019-06-08 PROCEDURE — 93306 TTE W/DOPPLER COMPLETE: CPT

## 2019-06-08 PROCEDURE — 84132 ASSAY OF SERUM POTASSIUM: CPT

## 2019-06-08 PROCEDURE — 80048 BASIC METABOLIC PNL TOTAL CA: CPT

## 2019-06-08 PROCEDURE — 83735 ASSAY OF MAGNESIUM: CPT

## 2019-06-08 PROCEDURE — 74011000250 HC RX REV CODE- 250: Performed by: FAMILY MEDICINE

## 2019-06-08 PROCEDURE — 74011250636 HC RX REV CODE- 250/636: Performed by: FAMILY MEDICINE

## 2019-06-08 PROCEDURE — 74011250636 HC RX REV CODE- 250/636: Performed by: EMERGENCY MEDICINE

## 2019-06-08 PROCEDURE — 83605 ASSAY OF LACTIC ACID: CPT

## 2019-06-08 PROCEDURE — 85025 COMPLETE CBC W/AUTO DIFF WBC: CPT

## 2019-06-08 PROCEDURE — 36415 COLL VENOUS BLD VENIPUNCTURE: CPT

## 2019-06-08 PROCEDURE — 71045 X-RAY EXAM CHEST 1 VIEW: CPT

## 2019-06-08 PROCEDURE — C9113 INJ PANTOPRAZOLE SODIUM, VIA: HCPCS | Performed by: FAMILY MEDICINE

## 2019-06-08 RX ORDER — POTASSIUM CHLORIDE 750 MG/1
40 TABLET, FILM COATED, EXTENDED RELEASE ORAL
Status: COMPLETED | OUTPATIENT
Start: 2019-06-08 | End: 2019-06-08

## 2019-06-08 RX ORDER — GUAIFENESIN/DEXTROMETHORPHAN 100-10MG/5
10 SYRUP ORAL
Status: DISCONTINUED | OUTPATIENT
Start: 2019-06-08 | End: 2019-06-10 | Stop reason: HOSPADM

## 2019-06-08 RX ORDER — DEXTROSE MONOHYDRATE AND SODIUM CHLORIDE 5; .45 G/100ML; G/100ML
75 INJECTION, SOLUTION INTRAVENOUS CONTINUOUS
Status: DISCONTINUED | OUTPATIENT
Start: 2019-06-08 | End: 2019-06-10 | Stop reason: HOSPADM

## 2019-06-08 RX ADMIN — ROSUVASTATIN CALCIUM 20 MG: 10 TABLET, FILM COATED ORAL at 10:58

## 2019-06-08 RX ADMIN — PIPERACILLIN AND TAZOBACTAM 3.38 G: 3; .375 INJECTION, POWDER, FOR SOLUTION INTRAVENOUS at 22:14

## 2019-06-08 RX ADMIN — HEPARIN SODIUM 5000 UNITS: 5000 INJECTION INTRAVENOUS; SUBCUTANEOUS at 01:48

## 2019-06-08 RX ADMIN — PIPERACILLIN AND TAZOBACTAM 3.38 G: 3; .375 INJECTION, POWDER, FOR SOLUTION INTRAVENOUS at 05:50

## 2019-06-08 RX ADMIN — Medication 10 ML: at 13:00

## 2019-06-08 RX ADMIN — PIPERACILLIN AND TAZOBACTAM 3.38 G: 3; .375 INJECTION, POWDER, FOR SOLUTION INTRAVENOUS at 13:33

## 2019-06-08 RX ADMIN — DEXTROSE MONOHYDRATE AND SODIUM CHLORIDE 75 ML/HR: 5; .45 INJECTION, SOLUTION INTRAVENOUS at 11:05

## 2019-06-08 RX ADMIN — HEPARIN SODIUM 5000 UNITS: 5000 INJECTION INTRAVENOUS; SUBCUTANEOUS at 10:58

## 2019-06-08 RX ADMIN — FINASTERIDE 5 MG: 5 TABLET, FILM COATED ORAL at 11:05

## 2019-06-08 RX ADMIN — PANTOPRAZOLE SODIUM 40 MG: 40 INJECTION, POWDER, FOR SOLUTION INTRAVENOUS at 10:59

## 2019-06-08 RX ADMIN — ASPIRIN 81 MG 81 MG: 81 TABLET ORAL at 10:58

## 2019-06-08 RX ADMIN — HEPARIN SODIUM 5000 UNITS: 5000 INJECTION INTRAVENOUS; SUBCUTANEOUS at 19:00

## 2019-06-08 RX ADMIN — POTASSIUM CHLORIDE 40 MEQ: 750 TABLET, FILM COATED, EXTENDED RELEASE ORAL at 11:05

## 2019-06-08 RX ADMIN — METOPROLOL SUCCINATE 100 MG: 50 TABLET, EXTENDED RELEASE ORAL at 10:58

## 2019-06-08 NOTE — PROGRESS NOTES
Problem: Falls - Risk of  Goal: *Absence of Falls  Description  Document Gregorio León Fall Risk and appropriate interventions in the flowsheet.   Outcome: Progressing Towards Goal

## 2019-06-08 NOTE — PROGRESS NOTES
Hospitalist Progress Note  Zerita Phoenix, MD  Answering service: [phone] OR [phone] from in house phone      Date of Service:  2019  NAME:  Ephraim Green  :  1940  MRN:  [id number]      Admission Summary:   The patient is an 49-year-old female with past medical history of appendicitis, hypertension, diabetes mellitus type 2, Meniere's disease, osteoporosis, GERD, breast cancer, who presents to the hospital with the above-mentioned symptom. History was primarily obtained from the patient. The patient reports that about 2 weeks back, she had sudden onset of sharp lower abdominal pain, especially situated on her left side. The patient reports that she had some irregular bowel movements associated with that, which were loose, went to her primary care physician, was told to go to a gastroenterologist, but she was not started on any medications or any imaging done. The patient reports symptoms somewhat got better, but she never got completely better, and about 2 days back, the pain came back. The patient reports the pain continued to be in the left lower quadrant. She also had some nausea and this morning had a fever of 101.3 degrees Fahrenheit. The patient reports that she also had a headache, felt dehydrated, got concerned, and decided to come to the ER. In the ER, the patient had an abnormal CT scan and was requested to be admitted under the hospitalist service. Interval history / Subjective:   Patient reports cough this am- with soreness of chest  Improved abdominal pain     Assessment & Plan:     1. Sepsis due to Acute colitis (POA)     Advance to reg diet, cont  IV hydration, pain control, IV antibiotics. GI consulted for eval and tx recs   cont Protonix. Minimal pain today  2. Chest soreness- likely from cough and vomiting-   BP low normal   neg troponins. echocardiogram ordered.    Atelectasis in the lingula seen on CT scan- possible early pneumonia? 3. Lactic acidosis due to infection and poor PO intake- cont IV hydration but switch to D5 and repeat lactate   5. Dehydration. cont IV hydration   6. Hypokalemia. replete K and recheck lab  7. Check Sed rate and mag levels      Code status: full  DVT prophylaxis: heparin    Care Plan discussed with: Patient/Family  Disposition: Home w/Family and TBD     Hospital Problems  Date Reviewed: 4/26/2016          Codes Class Noted POA    Colitis ICD-10-CM: K52.9  ICD-9-CM: 558.9  6/7/2019 Unknown                Review of Systems:   A comprehensive review of systems was negative except for that written in the HPI. Vital Signs:    Last 24hrs VS reviewed since prior progress note. Most recent are:  Visit Vitals  /65 (BP 1 Location: Right arm, BP Patient Position: At rest)   Pulse 93   Temp 100.2 °F (37.9 °C)   Resp 16   Ht 5' 2\" (1.575 m)   Wt 80.3 kg (177 lb 0.5 oz)   SpO2 93%   BMI 32.38 kg/m²         Intake/Output Summary (Last 24 hours) at 6/8/2019 3991  Last data filed at 6/7/2019 1508  Gross per 24 hour   Intake 1000 ml   Output 200 ml   Net 800 ml        Physical Examination:             Constitutional:  No acute distress, cooperative, pleasant    ENT:  Oral mucous moist,    Resp:  CTA bilaterally. No wheezing/rhonchi/rales. No accessory muscle use   CV:  Regular rhythm, normal rate, no murmurs, gallops, rubs    GI:  Soft, non distended,minimal tender in LLQ. Musculoskeletal:  No edema, warm, 2+ pulses throughout    Neurologic:  Moves all extremities.   AAOx3, CN II-XII reviewed          Data Review:    Review and/or order of tests in the radiology section of CPT  Review and/or order of tests in the medicine section of CPT      Labs:     Recent Labs     06/08/19  0157 06/07/19  1049   WBC 19.0* 28.7*   HGB 10.8* 12.9   HCT 33.5* 39.1    328     Recent Labs     06/08/19  0157 06/07/19  1049    138   K 3.0* 3.4*   * 106   CO2 23 24   BUN 16 25*   CREA 0. 76 0.90   * 133*   CA 8.2* 9.2     Recent Labs     06/07/19  1049   SGOT 12*   ALT 22      TBILI 0.5   TP 7.0   ALB 3.2*   GLOB 3.8   LPSE 81     No results for input(s): INR, PTP, APTT in the last 72 hours. No lab exists for component: INREXT   No results for input(s): FE, TIBC, PSAT, FERR in the last 72 hours. No results found for: FOL, RBCF   No results for input(s): PH, PCO2, PO2 in the last 72 hours. Recent Labs     06/07/19  1723   TROIQ <0.05     Lab Results   Component Value Date/Time    Cholesterol, total 183 06/16/2009 08:15 AM    HDL Cholesterol 44 06/16/2009 08:15 AM    LDL, calculated 101.6 (H) 06/16/2009 08:15 AM    Triglyceride 187 06/16/2009 08:15 AM    CHOL/HDL Ratio 4.2 06/16/2009 08:15 AM     Lab Results   Component Value Date/Time    Glucose (POC) 124 (H) 06/08/2019 06:28 AM    Glucose (POC) 136 (H) 06/07/2019 09:12 PM    Glucose (POC) 122 (H) 06/07/2019 04:32 PM    Glucose (POC) 116 (H) 04/27/2010 01:41 PM     Lab Results   Component Value Date/Time    Color YELLOW/STRAW 06/07/2019 02:49 PM    Appearance CLEAR 06/07/2019 02:49 PM    Specific gravity <1.005 06/07/2019 02:49 PM    pH (UA) 6.5 06/07/2019 02:49 PM    Protein NEGATIVE  06/07/2019 02:49 PM    Glucose NEGATIVE  06/07/2019 02:49 PM    Ketone NEGATIVE  06/07/2019 02:49 PM    Bilirubin NEGATIVE  06/07/2019 02:49 PM    Urobilinogen 0.2 06/07/2019 02:49 PM    Nitrites NEGATIVE  06/07/2019 02:49 PM    Leukocyte Esterase NEGATIVE  06/07/2019 02:49 PM     CT Results  (Last 48 hours)               06/07/19 1319  CT ABD PELV W CONT Final result    Impression:  IMPRESSION:    1. Patchy lingular airspace disease may represent pneumonia or chronic   infection/atelectasis. Correlation with fever, leukocytosis, etc. is   recommended. 2. Indeterminate, relatively long segment, circumferential, mid sigmoid, colonic   wall thickening. Under distention and/or chronic inflammation are favored over   malignancy.  Follow-up CT abdomen pelvis in 8-12 weeks is recommended. Colonoscopy may be indicated if this persists. Narrative:  CT ABDOMEN AND PELVIS WITH CONTRAST. 6/7/2019 1:19 PM        INDICATION: Abdominal pain for 2 weeks, vomiting, fever. COMPARISON: None. TECHNIQUE: CT of the abdomen and pelvis was performed after the administration   of 100 cc IV Isovue-370. CT dose reduction was achieved through use of a   standardized protocol tailored for this examination and automatic exposure   control for dose modulation. Adaptive statistical iterative reconstruction   (ASIR) was utilized. FINDINGS:   Inferior chest: Patchy airspace disease in the lingula may represent pneumonia   or chronic atelectasis or infection. Subtle, associated, varicoid bronchiectasis   suggests chronic atelectasis or infection. There is a moderate hiatal hernia. The heart size is normal. A left lumpectomy is partially imaged. Abdomen: Density of the liver is borderline for hepatic steatosis. Incidental   note is made of a tiny distal splenic artery aneurysm a right lower pole renal   cyst. The stomach, duodenum, gallbladder, pancreas, spleen, adrenals, and left   kidney are normal.       Pelvis: Mild, circumferential wall thickening extends over approximately 6 cm of   the mid sigmoid colon. This is likely due to a combination of underdistention   and possibly from chronic inflammation of associated diverticula. Malignancy is   considered less likely. No enlarged pelvic lymph nodes. No pericolonic fat   inflammation to suggest acute diverticulitis. The small bowel, ileocecal junction, proximal colon, and bladder are normal. The   appendix is not visualized; no pericecal inflammatory process. No free air or   fluid, and no abdominopelvic lymphadenopathy.                  Medications Reviewed:     Current Facility-Administered Medications   Medication Dose Route Frequency    dextrose 5 % - 0.45% NaCl infusion  75 mL/hr IntraVENous CONTINUOUS    potassium chloride SR (KLOR-CON 10) tablet 40 mEq  40 mEq Oral NOW    piperacillin-tazobactam (ZOSYN) 3.375 g in 0.9% sodium chloride (MBP/ADV) 100 mL  3.375 g IntraVENous Q8H    aspirin chewable tablet 81 mg  81 mg Oral DAILY    finasteride (PROSCAR) tablet 5 mg  5 mg Oral DAILY    metoprolol succinate (TOPROL-XL) XL tablet 100 mg  100 mg Oral DAILY    rosuvastatin (CRESTOR) tablet 20 mg  20 mg Oral DAILY    sodium chloride (NS) flush 5-40 mL  5-40 mL IntraVENous Q8H    sodium chloride (NS) flush 5-40 mL  5-40 mL IntraVENous PRN    acetaminophen (TYLENOL) tablet 650 mg  650 mg Oral Q4H PRN    heparin (porcine) injection 5,000 Units  5,000 Units SubCUTAneous Q8H    pantoprazole (PROTONIX) 40 mg in sodium chloride 0.9% 10 mL injection  40 mg IntraVENous DAILY    glucose chewable tablet 16 g  4 Tab Oral PRN    dextrose (D50W) injection syrg 12.5-25 g  25-50 mL IntraVENous PRN    glucagon (GLUCAGEN) injection 1 mg  1 mg IntraMUSCular PRN    insulin lispro (HUMALOG) injection   SubCUTAneous Q6H     ______________________________________________________________________  EXPECTED LENGTH OF STAY: - - -  ACTUAL LENGTH OF STAY:          1                 Harrison West MD

## 2019-06-08 NOTE — PROGRESS NOTES
Bedside and Verbal shift change report given to Mallory Leggett RN (oncoming nurse) by Catalina Price RN (offgoing nurse). Report included the following information SBAR, Kardex, MAR and Recent Results.

## 2019-06-09 LAB
ANION GAP SERPL CALC-SCNC: 6 MMOL/L (ref 5–15)
BUN SERPL-MCNC: 12 MG/DL (ref 6–20)
BUN/CREAT SERPL: 15 (ref 12–20)
CALCIUM SERPL-MCNC: 8 MG/DL (ref 8.5–10.1)
CHLORIDE SERPL-SCNC: 112 MMOL/L (ref 97–108)
CO2 SERPL-SCNC: 22 MMOL/L (ref 21–32)
CREAT SERPL-MCNC: 0.81 MG/DL (ref 0.55–1.02)
ERYTHROCYTE [DISTWIDTH] IN BLOOD BY AUTOMATED COUNT: 15.4 % (ref 11.5–14.5)
GLUCOSE BLD STRIP.AUTO-MCNC: 110 MG/DL (ref 65–100)
GLUCOSE BLD STRIP.AUTO-MCNC: 128 MG/DL (ref 65–100)
GLUCOSE SERPL-MCNC: 101 MG/DL (ref 65–100)
HCT VFR BLD AUTO: 31.1 % (ref 35–47)
HGB BLD-MCNC: 9.6 G/DL (ref 11.5–16)
MCH RBC QN AUTO: 26.8 PG (ref 26–34)
MCHC RBC AUTO-ENTMCNC: 30.9 G/DL (ref 30–36.5)
MCV RBC AUTO: 86.9 FL (ref 80–99)
NRBC # BLD: 0 K/UL (ref 0–0.01)
NRBC BLD-RTO: 0 PER 100 WBC
PLATELET # BLD AUTO: 184 K/UL (ref 150–400)
PMV BLD AUTO: 10 FL (ref 8.9–12.9)
POTASSIUM SERPL-SCNC: 3.5 MMOL/L (ref 3.5–5.1)
RBC # BLD AUTO: 3.58 M/UL (ref 3.8–5.2)
SERVICE CMNT-IMP: ABNORMAL
SERVICE CMNT-IMP: ABNORMAL
SODIUM SERPL-SCNC: 140 MMOL/L (ref 136–145)
WBC # BLD AUTO: 12.4 K/UL (ref 3.6–11)

## 2019-06-09 PROCEDURE — 82962 GLUCOSE BLOOD TEST: CPT

## 2019-06-09 PROCEDURE — 65270000029 HC RM PRIVATE

## 2019-06-09 PROCEDURE — 74011250637 HC RX REV CODE- 250/637: Performed by: FAMILY MEDICINE

## 2019-06-09 PROCEDURE — 74011250636 HC RX REV CODE- 250/636: Performed by: EMERGENCY MEDICINE

## 2019-06-09 PROCEDURE — 85027 COMPLETE CBC AUTOMATED: CPT

## 2019-06-09 PROCEDURE — C9113 INJ PANTOPRAZOLE SODIUM, VIA: HCPCS | Performed by: FAMILY MEDICINE

## 2019-06-09 PROCEDURE — 74011250636 HC RX REV CODE- 250/636: Performed by: FAMILY MEDICINE

## 2019-06-09 PROCEDURE — 36415 COLL VENOUS BLD VENIPUNCTURE: CPT

## 2019-06-09 PROCEDURE — 80048 BASIC METABOLIC PNL TOTAL CA: CPT

## 2019-06-09 PROCEDURE — 74011000258 HC RX REV CODE- 258: Performed by: EMERGENCY MEDICINE

## 2019-06-09 PROCEDURE — 74011000250 HC RX REV CODE- 250: Performed by: FAMILY MEDICINE

## 2019-06-09 RX ADMIN — HEPARIN SODIUM 5000 UNITS: 5000 INJECTION INTRAVENOUS; SUBCUTANEOUS at 19:01

## 2019-06-09 RX ADMIN — PIPERACILLIN AND TAZOBACTAM 3.38 G: 3; .375 INJECTION, POWDER, FOR SOLUTION INTRAVENOUS at 21:33

## 2019-06-09 RX ADMIN — HEPARIN SODIUM 5000 UNITS: 5000 INJECTION INTRAVENOUS; SUBCUTANEOUS at 02:32

## 2019-06-09 RX ADMIN — GUAIFENESIN AND DEXTROMETHORPHAN 10 ML: 100; 10 SYRUP ORAL at 09:01

## 2019-06-09 RX ADMIN — ASPIRIN 81 MG 81 MG: 81 TABLET ORAL at 09:01

## 2019-06-09 RX ADMIN — Medication 10 ML: at 21:33

## 2019-06-09 RX ADMIN — METOPROLOL SUCCINATE 100 MG: 50 TABLET, EXTENDED RELEASE ORAL at 09:01

## 2019-06-09 RX ADMIN — GUAIFENESIN AND DEXTROMETHORPHAN 10 ML: 100; 10 SYRUP ORAL at 15:44

## 2019-06-09 RX ADMIN — FINASTERIDE 5 MG: 5 TABLET, FILM COATED ORAL at 14:11

## 2019-06-09 RX ADMIN — Medication 10 ML: at 15:45

## 2019-06-09 RX ADMIN — PIPERACILLIN AND TAZOBACTAM 3.38 G: 3; .375 INJECTION, POWDER, FOR SOLUTION INTRAVENOUS at 05:39

## 2019-06-09 RX ADMIN — PANTOPRAZOLE SODIUM 40 MG: 40 INJECTION, POWDER, FOR SOLUTION INTRAVENOUS at 09:01

## 2019-06-09 RX ADMIN — ROSUVASTATIN CALCIUM 20 MG: 10 TABLET, FILM COATED ORAL at 09:01

## 2019-06-09 RX ADMIN — PIPERACILLIN AND TAZOBACTAM 3.38 G: 3; .375 INJECTION, POWDER, FOR SOLUTION INTRAVENOUS at 13:58

## 2019-06-09 NOTE — PROGRESS NOTES
Bedside shift change report given to Britton Guerra RN (oncoming nurse) by Francis Barnes RN (offgoing nurse). Report included the following information SBAR, MAR and Recent Results.

## 2019-06-09 NOTE — PROGRESS NOTES
Problem: Falls - Risk of  Goal: *Absence of Falls  Description  Document Easter Getting Fall Risk and appropriate interventions in the flowsheet.   Outcome: Progressing Towards Goal  Note:   Fall Risk Interventions:            Medication Interventions: Patient to call before getting OOB

## 2019-06-09 NOTE — PROGRESS NOTES
Hospitalist Progress Note  Carlos Blair MD  Answering service: [phone] OR [phone] from in house phone      Date of Service:  2019  NAME:  Jacqueline Renae  :  1940  MRN:  [id number]      Admission Summary:   The patient is an 76-year-old female with past medical history of appendicitis, hypertension, diabetes mellitus type 2, Meniere's disease, osteoporosis, GERD, breast cancer, who presents to the hospital with the above-mentioned symptom. History was primarily obtained from the patient. The patient reports that about 2 weeks back, she had sudden onset of sharp lower abdominal pain, especially situated on her left side. The patient reports that she had some irregular bowel movements associated with that, which were loose, went to her primary care physician, was told to go to a gastroenterologist, but she was not started on any medications or any imaging done. The patient reports symptoms somewhat got better, but she never got completely better, and about 2 days back, the pain came back. The patient reports the pain continued to be in the left lower quadrant. She also had some nausea and this morning had a fever of 101.3 degrees Fahrenheit. The patient reports that she also had a headache, felt dehydrated, got concerned, and decided to come to the ER. In the ER, the patient had an abnormal CT scan and was requested to be admitted under the hospitalist service. Interval history / Subjective:   Patient reports feels better this am  less abdominal pain, no chest pain  Eating breakfast, does not want OT/PT consult     Assessment & Plan:     1. Sepsis due to Acute colitis (POA)     tolerating reg diet, cont  IV hydration, pain control, IV antibiotics. cont Protonix. Minimal pain today, hold on GI consult  2. Chest soreness- likely from cough and vomiting-   Improved, CXR showing L LL atelectasis   neg troponins. echocardiogram WNL- borderline pulm HTN   3. Lactic acidosis due to infection and poor PO intake- resolved- cont IV hydration   5. Dehydration. resolved with IV hydration   6. Hypokalemia. repleted K   7. Sed rate elevated at 42, mag levels normal      Code status: full  DVT prophylaxis: heparin    Care Plan discussed with: Patient/Family  Disposition: Home w/Family and TBD     Hospital Problems  Date Reviewed: 4/26/2016          Codes Class Noted POA    Colitis ICD-10-CM: K52.9  ICD-9-CM: 558.9  6/7/2019 Unknown                Review of Systems:   A comprehensive review of systems was negative except for that written in the HPI. Vital Signs:    Last 24hrs VS reviewed since prior progress note. Most recent are:  Visit Vitals  /60 (BP 1 Location: Right arm, BP Patient Position: At rest)   Pulse 76   Temp 98.7 °F (37.1 °C)   Resp 16   Ht 5' 2\" (1.575 m)   Wt 80.2 kg (176 lb 12.9 oz)   SpO2 95%   BMI 32.34 kg/m²         Intake/Output Summary (Last 24 hours) at 6/9/2019 0904  Last data filed at 6/8/2019 1751  Gross per 24 hour   Intake 440 ml   Output    Net 440 ml        Physical Examination:             Constitutional:  No acute distress, cooperative, pleasant    ENT:  Oral mucous moist,    Resp:  CTA bilaterally. No wheezing/rhonchi/rales. No accessory muscle use   CV:  Regular rhythm, normal rate, no murmurs, gallops, rubs    GI:  Soft, non distended,minimal tender in LLQ. Musculoskeletal:  No edema, warm, 2+ pulses throughout    Neurologic:  Moves all extremities.   AAOx3, CN II-XII reviewed          Data Review:    Review and/or order of tests in the radiology section of CPT  Review and/or order of tests in the medicine section of CPT      Labs:     Recent Labs     06/09/19  0233 06/08/19  0157   WBC 12.4* 19.0*   HGB 9.6* 10.8*   HCT 31.1* 33.5*    290     Recent Labs     06/09/19  0233 06/08/19  1336 06/08/19  0157 06/07/19  1049     --  143 138   K 3.5 3.6 3.0* 3.4*   *  -- 112* 106   CO2 22  --  23 24   BUN 12  --  16 25*   CREA 0.81  --  0.76 0.90   *  --  111* 133*   CA 8.0*  --  8.2* 9.2   MG  --  1.9  --   --      Recent Labs     06/07/19  1049   SGOT 12*   ALT 22      TBILI 0.5   TP 7.0   ALB 3.2*   GLOB 3.8   LPSE 81     No results for input(s): INR, PTP, APTT in the last 72 hours. No lab exists for component: INREXT, INREXT   No results for input(s): FE, TIBC, PSAT, FERR in the last 72 hours. No results found for: FOL, RBCF   No results for input(s): PH, PCO2, PO2 in the last 72 hours. Recent Labs     06/07/19  1723   TROIQ <0.05     Lab Results   Component Value Date/Time    Cholesterol, total 183 06/16/2009 08:15 AM    HDL Cholesterol 44 06/16/2009 08:15 AM    LDL, calculated 101.6 (H) 06/16/2009 08:15 AM    Triglyceride 187 06/16/2009 08:15 AM    CHOL/HDL Ratio 4.2 06/16/2009 08:15 AM     Lab Results   Component Value Date/Time    Glucose (POC) 110 (H) 06/09/2019 07:12 AM    Glucose (POC) 129 (H) 06/08/2019 09:34 PM    Glucose (POC) 116 (H) 06/08/2019 04:33 PM    Glucose (POC) 120 (H) 06/08/2019 11:49 AM    Glucose (POC) 124 (H) 06/08/2019 06:28 AM     Lab Results   Component Value Date/Time    Color YELLOW/STRAW 06/07/2019 02:49 PM    Appearance CLEAR 06/07/2019 02:49 PM    Specific gravity <1.005 06/07/2019 02:49 PM    pH (UA) 6.5 06/07/2019 02:49 PM    Protein NEGATIVE  06/07/2019 02:49 PM    Glucose NEGATIVE  06/07/2019 02:49 PM    Ketone NEGATIVE  06/07/2019 02:49 PM    Bilirubin NEGATIVE  06/07/2019 02:49 PM    Urobilinogen 0.2 06/07/2019 02:49 PM    Nitrites NEGATIVE  06/07/2019 02:49 PM    Leukocyte Esterase NEGATIVE  06/07/2019 02:49 PM     CT Results  (Last 48 hours)               06/07/19 1319  CT ABD PELV W CONT Final result    Impression:  IMPRESSION:    1. Patchy lingular airspace disease may represent pneumonia or chronic   infection/atelectasis. Correlation with fever, leukocytosis, etc. is   recommended.    2. Indeterminate, relatively long segment, circumferential, mid sigmoid, colonic   wall thickening. Under distention and/or chronic inflammation are favored over   malignancy. Follow-up CT abdomen pelvis in 8-12 weeks is recommended. Colonoscopy may be indicated if this persists. Narrative:  CT ABDOMEN AND PELVIS WITH CONTRAST. 6/7/2019 1:19 PM        INDICATION: Abdominal pain for 2 weeks, vomiting, fever. COMPARISON: None. TECHNIQUE: CT of the abdomen and pelvis was performed after the administration   of 100 cc IV Isovue-370. CT dose reduction was achieved through use of a   standardized protocol tailored for this examination and automatic exposure   control for dose modulation. Adaptive statistical iterative reconstruction   (ASIR) was utilized. FINDINGS:   Inferior chest: Patchy airspace disease in the lingula may represent pneumonia   or chronic atelectasis or infection. Subtle, associated, varicoid bronchiectasis   suggests chronic atelectasis or infection. There is a moderate hiatal hernia. The heart size is normal. A left lumpectomy is partially imaged. Abdomen: Density of the liver is borderline for hepatic steatosis. Incidental   note is made of a tiny distal splenic artery aneurysm a right lower pole renal   cyst. The stomach, duodenum, gallbladder, pancreas, spleen, adrenals, and left   kidney are normal.       Pelvis: Mild, circumferential wall thickening extends over approximately 6 cm of   the mid sigmoid colon. This is likely due to a combination of underdistention   and possibly from chronic inflammation of associated diverticula. Malignancy is   considered less likely. No enlarged pelvic lymph nodes. No pericolonic fat   inflammation to suggest acute diverticulitis. The small bowel, ileocecal junction, proximal colon, and bladder are normal. The   appendix is not visualized; no pericecal inflammatory process. No free air or   fluid, and no abdominopelvic lymphadenopathy. Medications Reviewed:     Current Facility-Administered Medications   Medication Dose Route Frequency    dextrose 5 % - 0.45% NaCl infusion  75 mL/hr IntraVENous CONTINUOUS    guaiFENesin-dextromethorphan (ROBITUSSIN DM) 100-10 mg/5 mL syrup 10 mL  10 mL Oral Q6H PRN    piperacillin-tazobactam (ZOSYN) 3.375 g in 0.9% sodium chloride (MBP/ADV) 100 mL  3.375 g IntraVENous Q8H    aspirin chewable tablet 81 mg  81 mg Oral DAILY    finasteride (PROSCAR) tablet 5 mg  5 mg Oral DAILY    metoprolol succinate (TOPROL-XL) XL tablet 100 mg  100 mg Oral DAILY    rosuvastatin (CRESTOR) tablet 20 mg  20 mg Oral DAILY    sodium chloride (NS) flush 5-40 mL  5-40 mL IntraVENous Q8H    sodium chloride (NS) flush 5-40 mL  5-40 mL IntraVENous PRN    acetaminophen (TYLENOL) tablet 650 mg  650 mg Oral Q4H PRN    heparin (porcine) injection 5,000 Units  5,000 Units SubCUTAneous Q8H    pantoprazole (PROTONIX) 40 mg in sodium chloride 0.9% 10 mL injection  40 mg IntraVENous DAILY    glucose chewable tablet 16 g  4 Tab Oral PRN    dextrose (D50W) injection syrg 12.5-25 g  25-50 mL IntraVENous PRN    glucagon (GLUCAGEN) injection 1 mg  1 mg IntraMUSCular PRN    insulin lispro (HUMALOG) injection   SubCUTAneous Q6H     ______________________________________________________________________  EXPECTED LENGTH OF STAY: - - -  ACTUAL LENGTH OF STAY:          2                 Carlos Blair MD

## 2019-06-10 VITALS
DIASTOLIC BLOOD PRESSURE: 65 MMHG | SYSTOLIC BLOOD PRESSURE: 129 MMHG | TEMPERATURE: 98 F | WEIGHT: 176.59 LBS | HEART RATE: 68 BPM | RESPIRATION RATE: 16 BRPM | OXYGEN SATURATION: 94 % | HEIGHT: 62 IN | BODY MASS INDEX: 32.5 KG/M2

## 2019-06-10 PROBLEM — I95.2 HYPOTENSION DUE TO MEDICATION: Status: ACTIVE | Noted: 2019-06-10

## 2019-06-10 PROBLEM — E78.5 HYPERLIPIDEMIA: Status: ACTIVE | Noted: 2019-06-10

## 2019-06-10 PROBLEM — Z86.79 HX OF ESSENTIAL HYPERTENSION: Status: ACTIVE | Noted: 2019-06-10

## 2019-06-10 LAB
GLUCOSE BLD STRIP.AUTO-MCNC: 106 MG/DL (ref 65–100)
SERVICE CMNT-IMP: ABNORMAL

## 2019-06-10 PROCEDURE — 82962 GLUCOSE BLOOD TEST: CPT

## 2019-06-10 PROCEDURE — 74011000250 HC RX REV CODE- 250: Performed by: FAMILY MEDICINE

## 2019-06-10 PROCEDURE — C9113 INJ PANTOPRAZOLE SODIUM, VIA: HCPCS | Performed by: FAMILY MEDICINE

## 2019-06-10 PROCEDURE — 74011000258 HC RX REV CODE- 258: Performed by: EMERGENCY MEDICINE

## 2019-06-10 PROCEDURE — 74011250636 HC RX REV CODE- 250/636: Performed by: EMERGENCY MEDICINE

## 2019-06-10 PROCEDURE — 74011250636 HC RX REV CODE- 250/636: Performed by: FAMILY MEDICINE

## 2019-06-10 PROCEDURE — 74011250637 HC RX REV CODE- 250/637: Performed by: FAMILY MEDICINE

## 2019-06-10 RX ORDER — CIPROFLOXACIN 500 MG/1
500 TABLET ORAL 2 TIMES DAILY
Qty: 9 TAB | Refills: 0 | Status: SHIPPED | OUTPATIENT
Start: 2019-06-10 | End: 2020-08-17

## 2019-06-10 RX ADMIN — METOPROLOL SUCCINATE 100 MG: 50 TABLET, EXTENDED RELEASE ORAL at 09:39

## 2019-06-10 RX ADMIN — PIPERACILLIN AND TAZOBACTAM 3.38 G: 3; .375 INJECTION, POWDER, FOR SOLUTION INTRAVENOUS at 04:28

## 2019-06-10 RX ADMIN — ASPIRIN 81 MG 81 MG: 81 TABLET ORAL at 09:39

## 2019-06-10 RX ADMIN — PANTOPRAZOLE SODIUM 40 MG: 40 INJECTION, POWDER, FOR SOLUTION INTRAVENOUS at 09:39

## 2019-06-10 RX ADMIN — ROSUVASTATIN CALCIUM 20 MG: 10 TABLET, FILM COATED ORAL at 09:39

## 2019-06-10 RX ADMIN — HEPARIN SODIUM 5000 UNITS: 5000 INJECTION INTRAVENOUS; SUBCUTANEOUS at 04:28

## 2019-06-10 NOTE — PROGRESS NOTES
NUTRITION     Nutrition screening referral was triggered based on results obtained during nursing admission assessment for \"unsure wt loss. \"    The patient's chart was reviewed and nutrition assessment is not indicated at this time. No wt loss noted. Wt Readings from Last 10 Encounters:   06/10/19 80.1 kg (176 lb 9.4 oz)   03/13/17 78.5 kg (173 lb)   04/27/16 79.4 kg (175 lb)   05/30/12 79.4 kg (175 lb)   Colitis on admit but improving with diet advanced. Discharge planned for tomorrow per chart review. Plan to see patient for rescreen as indicated. Thank you.      Jerry Maradiaga RD

## 2019-06-10 NOTE — PROGRESS NOTES
Care Management Interventions  PCP Verified by CM: Yes  Palliative Care Criteria Met (RRAT>21 & CHF Dx)?: No  Mode of Transport at Discharge: Other (see comment)(son will transport home)  Physical Therapy Consult: No  Occupational Therapy Consult: No  Current Support Network: Other(Son lives with patient)  Confirm Follow Up Transport: Family  Plan discussed with Pt/Family/Caregiver: Yes  Discharge Location  Discharge Placement: Home     Cm met with patient and son. Son lives with patient who is very independent and able to manage on her own w/o AD. No Cm needs anticipated.     Reason for Admission:   colitis                   RRAT Score:  10                   Plan for utilizing home health:   none                       Current Advanced Directive/Advance Care Plan: none on file    Likelihood of Readmission:  low                         Transition of Care Plan:  Return home     Mauro Randall

## 2019-06-10 NOTE — DISCHARGE INSTRUCTIONS
Patient Education     Colitis: Care Instructions  Your Care Instructions  Colitis is the medical term for swelling (inflammation) of the intestine. It can be caused by different things, such as an infection or loss of blood flow in the intestine. Other causes are problems like Crohn's disease or ulcerative colitis. Symptoms may include fever, diarrhea that may be bloody, or belly pain. Sometimes symptoms go away without treatment. But you may need treatment or more tests, such as blood tests or a stool test. Or you may need imaging tests like a CT scan or a colonoscopy. In some cases, the doctor may want to test a sample of tissue from the intestine. This test is called a biopsy. The doctor has checked you carefully, but problems can develop later. If you notice any problems or new symptoms, get medical treatment right away. Follow-up care is a key part of your treatment and safety. Be sure to make and go to all appointments, and call your doctor if you are having problems. It's also a good idea to know your test results and keep a list of the medicines you take. How can you care for yourself at home? · Rest until you feel better. · Your doctor may recommend that you eat bland foods. These include rice, dry toast or crackers, bananas, and applesauce. · To prevent dehydration, drink plenty of fluids. Choose water and other caffeine-free clear liquids until you feel better. If you have kidney, heart, or liver disease and have to limit fluids, talk with your doctor before you increase the amount of fluids you drink. · Be safe with medicines. Take your medicines exactly as prescribed. Call your doctor if you think you are having a problem with your medicine. You will get more details on the specific medicines your doctor prescribes. When should you call for help? Call 911 anytime you think you may need emergency care. For example, call if:  · You passed out (lost consciousness).   · You vomit blood or what looks like coffee grounds. · Your stools are maroon or very bloody. Call your doctor now or seek immediate medical care if:  · You have new or worse pain. · You have a new or higher fever. · You have new or worse symptoms. · You cannot keep fluids or medicines down. Watch closely for changes in your health, and be sure to contact your doctor if:  · You do not get better as expected. Where can you learn more? Go to AskforTask.be  Enter [id number] in the search box to learn more about \"Colitis: Care Instructions. \"   © 0511-8570 [employer], [employer]. Care instructions adapted under license by [facility] (which disclaims liability or warranty for this information). This care instruction is for use with your licensed healthcare professional. If you have questions about a medical condition or this instruction, always ask your healthcare professional. [address] any warranty or liability for your use of this information.   Content Version: 42.5.687916; Current as of: November 20, 2015

## 2019-06-10 NOTE — DISCHARGE SUMMARY
Discharge Summary       PATIENT ID: Ely Collins  MRN: [id number]   YOB: 1940    DATE OF ADMISSION: 6/7/2019 10:16 AM    DATE OF DISCHARGE: 6/10/19 10:00am  PRIMARY CARE PROVIDER: Hetal Montalvo MD     ATTENDING PHYSICIAN: [address] [place] [address]  DISCHARGING PROVIDER: Madiha Duong MD    To contact this individual call [phone] and ask the  to page. If unavailable ask to be transferred the Adult Hospitalist Department. CONSULTATIONS: IP CONSULT TO GASTROENTEROLOGY    PROCEDURES/SURGERIES: * No surgery found *    [id number] [facility] COURSE:   The patient is an 66-year-old female with past medical history of appendicitis, hypertension, diabetes mellitus type 2, Meniere's disease, osteoporosis, GERD, breast cancer, who presents to the hospital with the above-mentioned symptom.  History was primarily obtained from the patient. Grisel Gallo patient reports that about 2 weeks back, she had sudden onset of sharp lower abdominal pain, especially situated on her left side.  The patient reports that she had some irregular bowel movements associated with that, which were loose, went to her primary care physician, was told to go to a gastroenterologist, but she was not started on any medications or any imaging done.  The patient reports symptoms somewhat got better, but she never got completely better, and about 2 days back, the pain came back.  The patient reports the pain continued to be in the left lower quadrant.  She also had some nausea and this morning had a fever of 101.3 degrees Fahrenheit.  The patient reports that she also had a headache, felt dehydrated, got concerned, and decided to come to the ER. DISCHARGE DIAGNOSES / PLAN:      1. Sepsis due to Acute infectious colitis (POA)     tolerating reg diet, she responded well to  IV hydration, pain control, IV antibiotics.      - she was hypotensive and her BP was low throughout most of her admission even on IV fluids and w/ holding her home meds- HCTZ and proscar- will tell her not to resume these on DC until her BP improved - I do wonder if this started as ischemic colitis due to low BP . Minimal pain today- eating well and no BM for 2 days- follow up with GI as needed- treated with IV zosyn for 3 days- home w 4 more days of PO cipro  2.  Chest soreness- likely from cough and vomiting-   Improved, CXR showing L LL atelectasis   neg troponins.   echocardiogram WNL- borderline pulm HTN   3.  Lactic acidosis due to infection and poor PO intake- resolved-   5.  Dehydration. resolved with IV hydration and holding diureitics  6.  Hypokalemia.  repleted K   7. Sed rate elevated at 42, mag levels normal  8. Drop in hemoglobin can be attributed more to dilutional effect then active GI bleeding     ADDITIONAL CARE RECOMMENDATIONS: none    PENDING TEST RESULTS:   At the time of discharge the following test results are still pending: none    FOLLOW UP APPOINTMENTS:    Follow-up Information     Follow up With Specialties Details Why Contact Info    Ana Silva MD Internal Medicine In 1 week for further blood pressure monitoring and to update PCP on medication changes [facility]  [address] [facility]      Ana Silva, [address] [facility] Internal Medicine   [place]  [address] [facility] [address]  [phone]        * follow up with gastroeterology as needed if abd pain returns or for any changes in bowel habits        DIET: Regular Diet-     ACTIVITY: Activity as tolerated    WOUND CARE: none    EQUIPMENT needed:none      DISCHARGE MEDICATIONS:  Current Discharge Medication List      START taking these medications    Details   ciprofloxacin HCl (CIPRO) 500 mg tablet Take 1 Tab by mouth two (2) times a day. Start today with dinner  Qty: 9 Tab, Refills: 0         CONTINUE these medications which have NOT CHANGED    Details   metoprolol succinate (TOPROL-XL) 100 mg tablet Take 100 mg by mouth daily.       calcium-cholecalciferol, D3, (CALTRATE 600+D) tablet Take 1 Tab by mouth daily. VIT C/VIT E/LUTEIN/MIN/OMEGA-3 (OCUVITE PO) Take 1 Tab by mouth two (2) times a day. Takes one po twice daily. BIOTIN PO Take 5,000 mcg by mouth two (2) times a day. rosuvastatin (CRESTOR) 20 mg tablet Take 20 mg by mouth daily. aspirin 81 mg tablet Take 81 mg by mouth daily. magnesium 250 mg tab Take 250 mg by mouth daily. STOP taking these medications       finasteride (PROSCAR) 5 mg tablet Comments:   Reason for Stopping:         hydrochlorothiazide (HYDRODIURIL) 25 mg tablet Comments:   Reason for Stopping:                 NOTIFY YOUR PHYSICIAN FOR ANY OF THE FOLLOWING:   Fever over 101 degrees for 24 hours. Chest pain, shortness of breath, fever, chills, nausea, vomiting, diarrhea, change in mentation, falling, weakness, bleeding. Severe pain or pain not relieved by medications. Or, any other signs or symptoms that you may have questions about. DISPOSITION:    Home With:   OT  PT  HH  RN       Long term SNF/Inpatient Rehab   X Independent    Hospice    Other:       PATIENT CONDITION AT DISCHARGE:     Functional status    Poor     Deconditioned    X Independent      Cognition   X  Lucid     Forgetful     Dementia      Catheters/lines (plus indication)    De Jesus     PICC     PEG    X None      Code status   X  Full code     DNR      PHYSICAL EXAMINATION AT DISCHARGE:  Patient Vitals for the past 24 hrs:   Temp Pulse Resp BP SpO2   06/10/19 0836 98 °F (36.7 °C) 68 16 129/65 94 %   06/10/19 0316 98.5 °F (36.9 °C) 73 16 123/70 95 %   06/09/19 1407 97.4 °F (36.3 °C) 75 16 100/62 93 %                                                     Constitutional:  No acute distress, cooperative, pleasant    ENT:  Oral mucous moist,    Resp:  CTA bilaterally. No wheezing/rhonchi/rales. No accessory muscle use   CV:  Regular rhythm, normal rate, no murmurs, gallops, rubs    GI:  Soft, non distended,minimal tender in LLQ.      Musculoskeletal:  No edema, warm, 2+ pulses throughout    Neurologic:  Moves all extremities.   AAOx3, CN II-XII reviewed          CHRONIC MEDICAL DIAGNOSES:  Problem List as of 6/10/2019 Date Reviewed: 6/10/2019          Codes Class Noted - Resolved    Hypotension due to medication ICD-10-CM: I95.2  ICD-9-CM: 458.8, E947.9  6/10/2019 - Present        Hyperlipidemia ICD-10-CM: E78.5  ICD-9-CM: 272.4  6/10/2019 - Present        Hx of essential hypertension ICD-10-CM: Z86.79  ICD-9-CM: V12.59  6/10/2019 - Present        Colitis ICD-10-CM: K52.9  ICD-9-CM: 558.9  6/7/2019 - Present        Rectal bleeding ICD-10-CM: K62.5  ICD-9-CM: 569.3  4/27/2016 - Present              Greater than 35 minutes were spent with the patient on counseling and coordination of care    Signed:   Joe Lau MD  6/10/2019  10:14 AM

## 2019-06-10 NOTE — PROGRESS NOTES
Problem: Falls - Risk of  Goal: *Absence of Falls  Description  Document Tjalda Wayne Fall Risk and appropriate interventions in the flowsheet. Outcome: Progressing Towards Goal  Note:   Fall Risk Interventions:     Gripper socks when out of bed. Call light within reach at all times.        Medication Interventions: Patient to call before getting OOB

## 2019-06-10 NOTE — PROGRESS NOTES
MD order to discharge. IV removed. Rx script faxed to [address] [place] [address] Road. Discharge instructions reviewed with patient and dtr Catherine Flores. Wheelchair escort to discharge area. Transport home by private car.

## 2019-06-10 NOTE — PROGRESS NOTES
Bedside shift change report given to Jeancarlos Herrera (oncoming nurse) by Rocael Cornejo (offgoing nurse). Report included the following information SBAR, Kardex, MAR and Recent Results.

## 2019-06-13 LAB
BACTERIA SPEC CULT: NORMAL
BACTERIA SPEC CULT: NORMAL
SERVICE CMNT-IMP: NORMAL
SERVICE CMNT-IMP: NORMAL

## 2020-07-28 ENCOUNTER — HOSPITAL ENCOUNTER (OUTPATIENT)
Dept: GENERAL RADIOLOGY | Age: 80
Discharge: HOME | End: 2020-07-28
Payer: MEDICARE

## 2020-07-28 DIAGNOSIS — M53.3 PAIN, COCCYX: ICD-10-CM

## 2020-07-28 PROCEDURE — 72100 X-RAY EXAM L-S SPINE 2/3 VWS: CPT | Performed by: INTERNAL MEDICINE

## 2020-07-28 PROCEDURE — 72170 X-RAY EXAM OF PELVIS: CPT | Performed by: INTERNAL MEDICINE

## 2020-08-17 ENCOUNTER — HOSPITAL ENCOUNTER (INPATIENT)
Age: 80
LOS: 1 days | Discharge: HOME | DRG: 552 | End: 2020-08-19
Attending: EMERGENCY MEDICINE | Admitting: INTERNAL MEDICINE
Payer: MEDICARE

## 2020-08-17 ENCOUNTER — APPOINTMENT (OUTPATIENT)
Dept: CT IMAGING | Age: 80
DRG: 552 | End: 2020-08-17
Attending: EMERGENCY MEDICINE
Payer: MEDICARE

## 2020-08-17 ENCOUNTER — APPOINTMENT (OUTPATIENT)
Dept: GENERAL RADIOLOGY | Age: 80
DRG: 552 | End: 2020-08-17
Attending: EMERGENCY MEDICINE
Payer: MEDICARE

## 2020-08-17 DIAGNOSIS — R10.32 ABDOMINAL PAIN, LLQ (LEFT LOWER QUADRANT): Primary | ICD-10-CM

## 2020-08-17 DIAGNOSIS — M48.061 SPINAL STENOSIS AT L4-L5 LEVEL: ICD-10-CM

## 2020-08-17 PROBLEM — R10.9 ABDOMINAL PAIN: Status: ACTIVE | Noted: 2020-08-17

## 2020-08-17 LAB
ABO + RH BLD: NORMAL
ALBUMIN SERPL-MCNC: 3.2 G/DL (ref 3.5–5)
ALBUMIN/GLOB SERPL: 0.7 {RATIO} (ref 1.1–2.2)
ALP SERPL-CCNC: 170 U/L (ref 45–117)
ALT SERPL-CCNC: 54 U/L (ref 12–78)
ANION GAP SERPL CALC-SCNC: 9 MMOL/L (ref 5–15)
APPEARANCE UR: ABNORMAL
AST SERPL-CCNC: 22 U/L (ref 15–37)
ATRIAL RATE: 91 BPM
BACTERIA URNS QL MICRO: NEGATIVE /HPF
BASOPHILS # BLD: 0.1 K/UL (ref 0–0.1)
BASOPHILS NFR BLD: 1 % (ref 0–1)
BILIRUB SERPL-MCNC: 0.3 MG/DL (ref 0.2–1)
BILIRUB UR QL: NEGATIVE
BLOOD GROUP ANTIBODIES SERPL: NORMAL
BUN SERPL-MCNC: 25 MG/DL (ref 6–20)
BUN/CREAT SERPL: 28 (ref 12–20)
CALCIUM SERPL-MCNC: 9.8 MG/DL (ref 8.5–10.1)
CALCULATED P AXIS, ECG09: 36 DEGREES
CALCULATED R AXIS, ECG10: 10 DEGREES
CALCULATED T AXIS, ECG11: 26 DEGREES
CHLORIDE SERPL-SCNC: 104 MMOL/L (ref 97–108)
CO2 SERPL-SCNC: 24 MMOL/L (ref 21–32)
COLOR UR: ABNORMAL
COMMENT, HOLDF: NORMAL
CREAT SERPL-MCNC: 0.88 MG/DL (ref 0.55–1.02)
D DIMER PPP FEU-MCNC: 1.26 MG/L FEU (ref 0–0.65)
DIAGNOSIS, 93000: NORMAL
DIFFERENTIAL METHOD BLD: ABNORMAL
EOSINOPHIL # BLD: 0.2 K/UL (ref 0–0.4)
EOSINOPHIL NFR BLD: 1 % (ref 0–7)
EPITH CASTS URNS QL MICRO: ABNORMAL /LPF
ERYTHROCYTE [DISTWIDTH] IN BLOOD BY AUTOMATED COUNT: 15.3 % (ref 11.5–14.5)
FERRITIN SERPL-MCNC: 45 NG/ML (ref 26–388)
GLOBULIN SER CALC-MCNC: 4.4 G/DL (ref 2–4)
GLUCOSE SERPL-MCNC: 123 MG/DL (ref 65–100)
GLUCOSE UR STRIP.AUTO-MCNC: NEGATIVE MG/DL
HCT VFR BLD AUTO: 32.2 % (ref 35–47)
HCT VFR BLD AUTO: 36.5 % (ref 35–47)
HGB BLD-MCNC: 10.1 G/DL (ref 11.5–16)
HGB BLD-MCNC: 11.7 G/DL (ref 11.5–16)
HGB UR QL STRIP: ABNORMAL
HYALINE CASTS URNS QL MICRO: ABNORMAL /LPF (ref 0–5)
IMM GRANULOCYTES # BLD AUTO: 0.1 K/UL (ref 0–0.04)
IMM GRANULOCYTES NFR BLD AUTO: 0 % (ref 0–0.5)
KETONES UR QL STRIP.AUTO: NEGATIVE MG/DL
LACTATE SERPL-SCNC: 1.1 MMOL/L (ref 0.4–2)
LDH SERPL L TO P-CCNC: 174 U/L (ref 81–246)
LEUKOCYTE ESTERASE UR QL STRIP.AUTO: ABNORMAL
LYMPHOCYTES # BLD: 1.7 K/UL (ref 0.8–3.5)
LYMPHOCYTES NFR BLD: 10 % (ref 12–49)
MCH RBC QN AUTO: 26.7 PG (ref 26–34)
MCHC RBC AUTO-ENTMCNC: 32.1 G/DL (ref 30–36.5)
MCV RBC AUTO: 83.1 FL (ref 80–99)
MONOCYTES # BLD: 0.8 K/UL (ref 0–1)
MONOCYTES NFR BLD: 4 % (ref 5–13)
NEUTS SEG # BLD: 14.8 K/UL (ref 1.8–8)
NEUTS SEG NFR BLD: 84 % (ref 32–75)
NITRITE UR QL STRIP.AUTO: NEGATIVE
NRBC # BLD: 0 K/UL (ref 0–0.01)
NRBC BLD-RTO: 0 PER 100 WBC
P-R INTERVAL, ECG05: 190 MS
PH UR STRIP: 7.5 [PH] (ref 5–8)
PLATELET # BLD AUTO: 292 K/UL (ref 150–400)
PMV BLD AUTO: 9.7 FL (ref 8.9–12.9)
POTASSIUM SERPL-SCNC: 3.7 MMOL/L (ref 3.5–5.1)
PROT SERPL-MCNC: 7.6 G/DL (ref 6.4–8.2)
PROT UR STRIP-MCNC: NEGATIVE MG/DL
Q-T INTERVAL, ECG07: 360 MS
QRS DURATION, ECG06: 72 MS
QTC CALCULATION (BEZET), ECG08: 442 MS
RBC # BLD AUTO: 4.39 M/UL (ref 3.8–5.2)
RBC #/AREA URNS HPF: ABNORMAL /HPF (ref 0–5)
SAMPLES BEING HELD,HOLD: NORMAL
SODIUM SERPL-SCNC: 137 MMOL/L (ref 136–145)
SP GR UR REFRACTOMETRY: 1.01
SPECIMEN EXP DATE BLD: NORMAL
UR CULT HOLD, URHOLD: NORMAL
UROBILINOGEN UR QL STRIP.AUTO: 0.2 EU/DL (ref 0.2–1)
VENTRICULAR RATE, ECG03: 91 BPM
WBC # BLD AUTO: 17.5 K/UL (ref 3.6–11)
WBC URNS QL MICRO: ABNORMAL /HPF (ref 0–4)

## 2020-08-17 PROCEDURE — 96375 TX/PRO/DX INJ NEW DRUG ADDON: CPT

## 2020-08-17 PROCEDURE — 87635 SARS-COV-2 COVID-19 AMP PRB: CPT

## 2020-08-17 PROCEDURE — 83605 ASSAY OF LACTIC ACID: CPT

## 2020-08-17 PROCEDURE — 99218 HC RM OBSERVATION: CPT

## 2020-08-17 PROCEDURE — 85018 HEMOGLOBIN: CPT

## 2020-08-17 PROCEDURE — 80053 COMPREHEN METABOLIC PANEL: CPT

## 2020-08-17 PROCEDURE — C9113 INJ PANTOPRAZOLE SODIUM, VIA: HCPCS | Performed by: FAMILY MEDICINE

## 2020-08-17 PROCEDURE — 74011000258 HC RX REV CODE- 258: Performed by: RADIOLOGY

## 2020-08-17 PROCEDURE — 86900 BLOOD TYPING SEROLOGIC ABO: CPT

## 2020-08-17 PROCEDURE — 36415 COLL VENOUS BLD VENIPUNCTURE: CPT

## 2020-08-17 PROCEDURE — 74011000258 HC RX REV CODE- 258: Performed by: FAMILY MEDICINE

## 2020-08-17 PROCEDURE — 74011636320 HC RX REV CODE- 636/320: Performed by: RADIOLOGY

## 2020-08-17 PROCEDURE — 74177 CT ABD & PELVIS W/CONTRAST: CPT

## 2020-08-17 PROCEDURE — 74011250636 HC RX REV CODE- 250/636: Performed by: FAMILY MEDICINE

## 2020-08-17 PROCEDURE — 96367 TX/PROPH/DG ADDL SEQ IV INF: CPT

## 2020-08-17 PROCEDURE — 74011250636 HC RX REV CODE- 250/636: Performed by: EMERGENCY MEDICINE

## 2020-08-17 PROCEDURE — 83615 LACTATE (LD) (LDH) ENZYME: CPT

## 2020-08-17 PROCEDURE — 99285 EMERGENCY DEPT VISIT HI MDM: CPT

## 2020-08-17 PROCEDURE — 93005 ELECTROCARDIOGRAM TRACING: CPT

## 2020-08-17 PROCEDURE — 82728 ASSAY OF FERRITIN: CPT

## 2020-08-17 PROCEDURE — 94761 N-INVAS EAR/PLS OXIMETRY MLT: CPT

## 2020-08-17 PROCEDURE — 74011000250 HC RX REV CODE- 250: Performed by: FAMILY MEDICINE

## 2020-08-17 PROCEDURE — 85025 COMPLETE CBC W/AUTO DIFF WBC: CPT

## 2020-08-17 PROCEDURE — 81001 URINALYSIS AUTO W/SCOPE: CPT

## 2020-08-17 PROCEDURE — 96365 THER/PROPH/DIAG IV INF INIT: CPT

## 2020-08-17 PROCEDURE — 87040 BLOOD CULTURE FOR BACTERIA: CPT

## 2020-08-17 PROCEDURE — 85379 FIBRIN DEGRADATION QUANT: CPT

## 2020-08-17 PROCEDURE — 71046 X-RAY EXAM CHEST 2 VIEWS: CPT

## 2020-08-17 RX ORDER — LEVOFLOXACIN 5 MG/ML
500 INJECTION, SOLUTION INTRAVENOUS
Status: COMPLETED | OUTPATIENT
Start: 2020-08-17 | End: 2020-08-17

## 2020-08-17 RX ORDER — METOPROLOL SUCCINATE 50 MG/1
100 TABLET, EXTENDED RELEASE ORAL DAILY
Status: CANCELLED | OUTPATIENT
Start: 2020-08-18

## 2020-08-17 RX ORDER — METOPROLOL SUCCINATE 50 MG/1
100 TABLET, EXTENDED RELEASE ORAL DAILY
Status: DISCONTINUED | OUTPATIENT
Start: 2020-08-18 | End: 2020-08-19 | Stop reason: HOSPADM

## 2020-08-17 RX ORDER — SODIUM CHLORIDE 0.9 % (FLUSH) 0.9 %
5-40 SYRINGE (ML) INJECTION AS NEEDED
Status: DISCONTINUED | OUTPATIENT
Start: 2020-08-17 | End: 2020-08-19 | Stop reason: HOSPADM

## 2020-08-17 RX ORDER — ONDANSETRON 2 MG/ML
4 INJECTION INTRAMUSCULAR; INTRAVENOUS
Status: DISCONTINUED | OUTPATIENT
Start: 2020-08-17 | End: 2020-08-19 | Stop reason: HOSPADM

## 2020-08-17 RX ORDER — CYCLOBENZAPRINE HCL 5 MG
5-10 TABLET ORAL
COMMUNITY
End: 2020-08-20

## 2020-08-17 RX ORDER — SODIUM CHLORIDE 9 MG/ML
75 INJECTION, SOLUTION INTRAVENOUS CONTINUOUS
Status: DISCONTINUED | OUTPATIENT
Start: 2020-08-17 | End: 2020-08-19 | Stop reason: HOSPADM

## 2020-08-17 RX ORDER — ACETAMINOPHEN 325 MG/1
650 TABLET ORAL
Status: DISCONTINUED | OUTPATIENT
Start: 2020-08-17 | End: 2020-08-19 | Stop reason: HOSPADM

## 2020-08-17 RX ORDER — SODIUM CHLORIDE 0.9 % (FLUSH) 0.9 %
5-40 SYRINGE (ML) INJECTION EVERY 8 HOURS
Status: DISCONTINUED | OUTPATIENT
Start: 2020-08-17 | End: 2020-08-19 | Stop reason: HOSPADM

## 2020-08-17 RX ORDER — HYDROCHLOROTHIAZIDE 25 MG/1
25 TABLET ORAL DAILY
COMMUNITY

## 2020-08-17 RX ORDER — SODIUM CHLORIDE 0.9 % (FLUSH) 0.9 %
10 SYRINGE (ML) INJECTION
Status: COMPLETED | OUTPATIENT
Start: 2020-08-17 | End: 2020-08-17

## 2020-08-17 RX ORDER — FINASTERIDE 5 MG/1
5 TABLET, FILM COATED ORAL DAILY
COMMUNITY

## 2020-08-17 RX ORDER — METRONIDAZOLE 500 MG/100ML
500 INJECTION, SOLUTION INTRAVENOUS
Status: COMPLETED | OUTPATIENT
Start: 2020-08-17 | End: 2020-08-17

## 2020-08-17 RX ORDER — THERA TABS 400 MCG
1 TAB ORAL DAILY
Status: DISCONTINUED | OUTPATIENT
Start: 2020-08-18 | End: 2020-08-19 | Stop reason: HOSPADM

## 2020-08-17 RX ORDER — FINASTERIDE 5 MG/1
5 TABLET, FILM COATED ORAL DAILY
Status: DISCONTINUED | OUTPATIENT
Start: 2020-08-18 | End: 2020-08-19 | Stop reason: HOSPADM

## 2020-08-17 RX ADMIN — SODIUM CHLORIDE 1000 ML: 9 INJECTION, SOLUTION INTRAVENOUS at 16:03

## 2020-08-17 RX ADMIN — LEVOFLOXACIN 500 MG: 5 INJECTION, SOLUTION INTRAVENOUS at 16:03

## 2020-08-17 RX ADMIN — SODIUM CHLORIDE 40 MG: 9 INJECTION INTRAMUSCULAR; INTRAVENOUS; SUBCUTANEOUS at 18:59

## 2020-08-17 RX ADMIN — IOPAMIDOL 100 ML: 755 INJECTION, SOLUTION INTRAVENOUS at 11:13

## 2020-08-17 RX ADMIN — Medication 10 ML: at 11:13

## 2020-08-17 RX ADMIN — PIPERACILLIN AND TAZOBACTAM 3.38 G: 3; .375 INJECTION, POWDER, LYOPHILIZED, FOR SOLUTION INTRAVENOUS at 18:59

## 2020-08-17 RX ADMIN — SODIUM CHLORIDE 100 ML: 900 INJECTION, SOLUTION INTRAVENOUS at 11:13

## 2020-08-17 RX ADMIN — METRONIDAZOLE 500 MG: 500 INJECTION, SOLUTION INTRAVENOUS at 17:25

## 2020-08-17 NOTE — ED TRIAGE NOTES
Pt c/o lower back pain for one month, fever 2 weeks ago and fever again today of 101, +diarrhea intermittent 3-4 weeks of diarrhea, bright red maybe, +nausea, denies sob, denies cough

## 2020-08-17 NOTE — PROGRESS NOTES
Admission Medication Reconciliation:    Information obtained from:  [facility]:  YES    Comments/Recommendations: Updated PTA meds/reviewed patient's allergies. 1)  Medication list updated with latest information from rx query     2)  Medication changes (since last review): Added  - Cyclobenzaprine  - Finasteride  - HCTZ    Adjusted  - None     Removed  - Ciprofloxacin     ¹RxQuery pharmacy benefit data reflects medications filled and processed through the patient's insurance, however   this data does NOT capture whether the medication was picked up or is currently being taken by the patient. Allergies:  Fosamax [alendronate]    Significant PMH/Disease States:   Past Medical History:   Diagnosis Date    Cancer ([facility] [address] 2000    left breast - treated with surgery/chemo/radiation    Cancer ([facility] [address]     \"something removed from face 10 yrs ago\" - skin cancer    Diabetes ([facility] [address]     borderline at one time    GERD (gastroesophageal reflux disease)     Hypertension     Ill-defined condition     osteoporosis    Other ill-defined conditions(799.89)     increased cholesterol    Other ill-defined conditions(799.89)     meneer's disease - right ear/has bilateral hearing aids     Chief Complaint for this Admission:    Chief Complaint   Patient presents with    Back Pain    Fever    Diarrhea     Prior to Admission Medications:   Prior to Admission Medications   Prescriptions Last Dose Informant Taking? BIOTIN PO   Yes   Sig: Take 5,000 mcg by mouth two (2) times a day. VIT C/VIT E/LUTEIN/MIN/OMEGA-3 (OCUVITE PO)   Yes   Sig: Take 1 Tab by mouth two (2) times a day. Takes one po twice daily. aspirin 81 mg tablet   Yes   Sig: Take 81 mg by mouth daily. calcium-cholecalciferol, D3, (CALTRATE 600+D) tablet   Yes   Sig: Take 1 Tab by mouth daily. cyclobenzaprine (FLEXERIL) 5 mg tablet   Yes   Sig: Take 5-10 mg by mouth nightly as needed for Muscle Spasm(s).    finasteride (PROSCAR) 5 mg tablet Yes   Sig: Take 5 mg by mouth daily. hydroCHLOROthiazide (HYDRODIURIL) 25 mg tablet   Yes   Sig: Take 25 mg by mouth daily. magnesium 250 mg tab   Yes   Sig: Take 250 mg by mouth daily. metoprolol succinate (TOPROL-XL) 100 mg tablet   Yes   Sig: Take 100 mg by mouth daily. rosuvastatin (CRESTOR) 20 mg tablet   Yes   Sig: Take 20 mg by mouth daily. Facility-Administered Medications: None       Please contact the main inpatient pharmacy with any questions or concerns at [phone] and we will direct you to the clinical pharmacist covering this patient's care while in-house.    Amanda Gurrola, IVELISSED

## 2020-08-17 NOTE — H&P
History & Physical    Primary Care Provider: Judson Prince MD  Source of Information: Patient     History of Presenting Illness:   Jessie Vargas is a 78 y.o. female who presents with abdominal pain    History was obtained from the patient      Patient reports that she has had chronic low back pain and has been seeing her primary care physician for the same. Patient reports recently she started having increased lower back pain associated with pain in the lower part of her abdomen which was crampy in nature. Patient reports that she has history of diverticulosis, was started on antibiotics about 2 weeks back, completed the antibiotic course for 1 week, continue to have pain and discomfort. She woke up this morning and had bright red blood in her stool, reports that she had some diarrhea last night. She also had a fever of 102.5 this morning got concerned and decided to come to the hospital.  Patient reports that she has a poor appetite and feels nauseous. Denies any other complaints or problems. She denies any bladder or bowel incontinence or any saddle anesthesia. The patient denies any fever, chills, chest pain, cough, congestion, recent illness, palpitations, or dysuria. Review of Systems:  A comprehensive review of systems was negative except for that written in the History of Present Illness.      Past Medical History:   Diagnosis Date    Cancer [facility]) 2000    left breast - treated with surgery/chemo/radiation    Cancer ([facility] [address]     \"something removed from face 10 yrs ago\" - skin cancer    Diabetes ([facility] [address]     borderline at one time    GERD (gastroesophageal reflux disease)     Hypertension     Ill-defined condition     osteoporosis    Other ill-defined conditions(799.89)     increased cholesterol    Other ill-defined conditions(799.89)     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 Authorizing Provider   ciprofloxacin HCl (CIPRO) 500 mg tablet Take 1 Tab by mouth two (2) times a day. Start today with dinner 6/10/19   Ayla Stearns MD   metoprolol succinate (TOPROL-XL) 100 mg tablet Take 100 mg by mouth daily. Provider, Historical   calcium-cholecalciferol, D3, (CALTRATE 600+D) tablet Take 1 Tab by mouth daily. Provider, Historical   VIT C/VIT E/LUTEIN/MIN/OMEGA-3 (OCUVITE PO) Take 1 Tab by mouth two (2) times a day. Takes one po twice daily. Provider, Historical   magnesium 250 mg tab Take 250 mg by mouth daily. Provider, Historical   BIOTIN PO Take 5,000 mcg by mouth two (2) times a day. Provider, Historical   rosuvastatin (CRESTOR) 20 mg tablet Take 20 mg by mouth daily. Provider, Historical   aspirin 81 mg tablet Take 81 mg by mouth daily. Provider, Historical     Allergies   Allergen Reactions    Fosamax [Alendronate] Other (comments)     Esophageal spasm/difficulty swallowing - possibility the medication caused this.       Family History   Problem Relation Age of Onset    Breast Cancer Mother     Heart Disease Father         SOCIAL HISTORY:  Patient resides:  Independently x   Assisted Living    SNF    With family care       Smoking history:   None x   Former    Chronic      Alcohol history:   None    Social x   Chronic      Ambulates:   Independently x   w/cane    w/walker    w/wc    CODE STATUS:  DNR    Full x   Other      Objective:     Physical Exam:     Visit Vitals  /48   Pulse 83   Temp 98.1 °F (36.7 °C)   Resp 15   SpO2 97%      O2 Device: Room air    General : alert x 3, awake, no acute distress, resting in bed, pleasant female, appears to be stated age  [de-identified]: PEERL, EOMI, moist mucus membrane, TM clear  Neck: supple, no JVD, no meningeal signs  Chest: Clear to auscultation bilaterally   CVS: S1 S2 heard, Capillary refill less than 2 seconds  Abd: soft/ Non tender, mildly tender to palpation in the left and right lower quadrants, BS physiological,   Ext: no clubbing, no cyanosis, no edema, brisk 2+ DP pulses  Neuro/Psych: pleasant mood and affect, CN 2-12 grossly intact, sensory grossly within normal limit, Strength 5/5 in all extremities, DTR 1+ x 4  Ski: warm      EKG: Normal sinus rhythm with nonspecific ST changes      Data Review:     Recent Days:  Recent Labs     08/17/20  0944   WBC 17.5*   HGB 11.7   HCT 36.5        Recent Labs     08/17/20  0944      K 3.7      CO2 24   *   BUN 25*   CREA 0.88   CA 9.8   ALB 3.2*   ALT 54     No results for input(s): PH, PCO2, PO2, HCO3, FIO2 in the last 72 hours.     24 Hour Results:  Recent Results (from the past 24 hour(s))   EKG, 12 LEAD, INITIAL    Collection Time: 08/17/20  9:32 AM   Result Value Ref Range    Ventricular Rate 91 BPM    Atrial Rate 91 BPM    P-R Interval 190 ms    QRS Duration 72 ms    Q-T Interval 360 ms    QTC Calculation (Bezet) 442 ms    Calculated P Axis 36 degrees    Calculated R Axis 10 degrees    Calculated T Axis 26 degrees    Diagnosis       Normal sinus rhythm  When compared with ECG of 30-AUG-2017 12:54,  No significant change was found  Confirmed by Adan Chaidez M.D., [facility] ([id number]) on 8/17/2020 12:12:00 PM     CBC WITH AUTOMATED DIFF    Collection Time: 08/17/20  9:44 AM   Result Value Ref Range    WBC 17.5 (H) 3.6 - 11.0 K/uL    RBC 4.39 3.80 - 5.20 M/uL    HGB 11.7 11.5 - 16.0 g/dL    HCT 36.5 35.0 - 47.0 %    MCV 83.1 80.0 - 99.0 FL    MCH 26.7 26.0 - 34.0 PG    MCHC 32.1 30.0 - 36.5 g/dL    RDW 15.3 (H) 11.5 - 14.5 %    PLATELET 516 855 - 535 K/uL MPV 9.7 8.9 - 12.9 FL    NRBC 0.0 0  WBC    ABSOLUTE NRBC 0.00 0.00 - 0.01 K/uL    NEUTROPHILS 84 (H) 32 - 75 %    LYMPHOCYTES 10 (L) 12 - 49 %    MONOCYTES 4 (L) 5 - 13 %    EOSINOPHILS 1 0 - 7 %    BASOPHILS 1 0 - 1 %    IMMATURE GRANULOCYTES 0 0.0 - 0.5 %    ABS. NEUTROPHILS 14.8 (H) 1.8 - 8.0 K/UL    ABS. LYMPHOCYTES 1.7 0.8 - 3.5 K/UL    ABS. MONOCYTES 0.8 0.0 - 1.0 K/UL    ABS. EOSINOPHILS 0.2 0.0 - 0.4 K/UL    ABS. BASOPHILS 0.1 0.0 - 0.1 K/UL    ABS. IMM. GRANS. 0.1 (H) 0.00 - 0.04 K/UL    DF AUTOMATED     METABOLIC PANEL, COMPREHENSIVE    Collection Time: 08/17/20  9:44 AM   Result Value Ref Range    Sodium 137 136 - 145 mmol/L    Potassium 3.7 3.5 - 5.1 mmol/L    Chloride 104 97 - 108 mmol/L    CO2 24 21 - 32 mmol/L    Anion gap 9 5 - 15 mmol/L    Glucose 123 (H) 65 - 100 mg/dL    BUN 25 (H) 6 - 20 MG/DL    Creatinine 0.88 0.55 - 1.02 MG/DL    BUN/Creatinine ratio 28 (H) 12 - 20      GFR est AA >60 >60 ml/min/1.73m2    GFR est non-AA >60 >60 ml/min/1.73m2    Calcium 9.8 8.5 - 10.1 MG/DL    Bilirubin, total 0.3 0.2 - 1.0 MG/DL    ALT (SGPT) 54 12 - 78 U/L    AST (SGOT) 22 15 - 37 U/L    Alk. phosphatase 170 (H) 45 - 117 U/L    Protein, total 7.6 6.4 - 8.2 g/dL    Albumin 3.2 (L) 3.5 - 5.0 g/dL    Globulin 4.4 (H) 2.0 - 4.0 g/dL    A-G Ratio 0.7 (L) 1.1 - 2.2     LACTIC ACID    Collection Time: 08/17/20  9:44 AM   Result Value Ref Range    Lactic acid 1.1 0.4 - 2.0 MMOL/L   SAMPLES BEING HELD    Collection Time: 08/17/20  9:45 AM   Result Value Ref Range    SAMPLES BEING HELD  1 blue, 1 red     COMMENT        Add-on orders for these samples will be processed based on acceptable specimen integrity and analyte stability, which may vary by analyte.    FERRITIN    Collection Time: 08/17/20  9:45 AM   Result Value Ref Range    Ferritin 45 26 - 388 NG/ML   LD    Collection Time: 08/17/20  9:45 AM   Result Value Ref Range     81 - 246 U/L   URINALYSIS W/MICROSCOPIC    Collection Time: 08/17/20 12:25 PM   Result Value Ref Range    Color YELLOW/STRAW      Appearance CLOUDY (A) CLEAR      Specific gravity 1.010      pH (UA) 7.5 5.0 - 8.0      Protein Negative NEG mg/dL    Glucose Negative NEG mg/dL    Ketone Negative NEG mg/dL    Bilirubin Negative NEG      Blood TRACE (A) NEG      Urobilinogen 0.2 0.2 - 1.0 EU/dL    Nitrites Negative NEG      Leukocyte Esterase TRACE (A) NEG      WBC 5-10 0 - 4 /hpf    RBC 10-20 0 - 5 /hpf    Epithelial cells MANY (A) FEW /lpf    Bacteria Negative NEG /hpf    Hyaline cast 2-5 0 - 5 /lpf   URINE CULTURE HOLD SAMPLE    Collection Time: 08/17/20 12:25 PM    Specimen: Serum; Urine   Result Value Ref Range    Urine culture hold        Urine on hold in Microbiology dept for 2 days. If unpreserved urine is submitted, it cannot be used for addtional testing after 24 hours, recollection will be required. SARS-COV-2    Collection Time: 08/17/20  4:20 PM   Result Value Ref Range    Specimen source Nasopharyngeal      SARS-CoV-2 PENDING     SARS-CoV-2 PENDING     Specimen source Nasopharyngeal      COVID-19 rapid test PENDING     Specimen type NP Swab      Health status PENDING     COVID-19 PENDING          Imaging:   Xr Chest Pa Lat    Result Date: 8/17/2020  IMPRESSION: 1. Small focus of increased density in the lingula may be related to atelectasis/scarring. Postoperative changes are noted in the left breast. There is a small hiatal hernia. Ct Abd Pelv W Cont    Result Date: 8/17/2020  IMPRESSION: No acute intra-abdominal process is identified. There is a hiatal hernia. There is hepatic steatosis.     Assessment/Plan      Abdominal pain/ GI bleed/fever: Unclear etiology, questionable diverticular bleed, patient will be observed on a telemetry bed, patient received 1 dose of IV antibiotics in the ER, will continue patient on IV Zosyn, IV hydration, clear liquid diet, gastroenterology has been consulted, monitor H&H, Protonix, supportive care and close monitoring, may need colonoscopy, will defer to GI.  COVID-19 testing pending. Hold aspirin for tonight    Diabetes mellitus type 2: Patient being sliding scale level on insulin, Accu-Cheks, diet control, close monitoring.     Hypertension: Continue home medications and continue monitor    Hyperlipidemia: Hold Crestor for tonight      GI DVT prophylaxis: Patient will be on SCDs             Signed By: Anthony Morse MD     August 17, 2020

## 2020-08-18 LAB
ANION GAP SERPL CALC-SCNC: 10 MMOL/L (ref 5–15)
BASOPHILS # BLD: 0.1 K/UL (ref 0–0.1)
BASOPHILS NFR BLD: 1 % (ref 0–1)
BUN SERPL-MCNC: 17 MG/DL (ref 6–20)
BUN/CREAT SERPL: 24 (ref 12–20)
CALCIUM SERPL-MCNC: 8.7 MG/DL (ref 8.5–10.1)
CHLORIDE SERPL-SCNC: 108 MMOL/L (ref 97–108)
CO2 SERPL-SCNC: 22 MMOL/L (ref 21–32)
COMMENT, HOLDF: NORMAL
CREAT SERPL-MCNC: 0.7 MG/DL (ref 0.55–1.02)
D DIMER PPP FEU-MCNC: 0.99 MG/L FEU (ref 0–0.65)
DIFFERENTIAL METHOD BLD: ABNORMAL
EOSINOPHIL # BLD: 0.4 K/UL (ref 0–0.4)
EOSINOPHIL NFR BLD: 4 % (ref 0–7)
ERYTHROCYTE [DISTWIDTH] IN BLOOD BY AUTOMATED COUNT: 15.6 % (ref 11.5–14.5)
GLUCOSE SERPL-MCNC: 107 MG/DL (ref 65–100)
HCT VFR BLD AUTO: 32.6 % (ref 35–47)
HEALTH STATUS, XMCV2T: NORMAL
HEMOCCULT STL QL: NEGATIVE
HGB BLD-MCNC: 10.4 G/DL (ref 11.5–16)
IMM GRANULOCYTES # BLD AUTO: 0 K/UL (ref 0–0.04)
IMM GRANULOCYTES NFR BLD AUTO: 0 % (ref 0–0.5)
LYMPHOCYTES # BLD: 2.4 K/UL (ref 0.8–3.5)
LYMPHOCYTES NFR BLD: 24 % (ref 12–49)
MCH RBC QN AUTO: 26.9 PG (ref 26–34)
MCHC RBC AUTO-ENTMCNC: 31.9 G/DL (ref 30–36.5)
MCV RBC AUTO: 84.2 FL (ref 80–99)
MONOCYTES # BLD: 0.7 K/UL (ref 0–1)
MONOCYTES NFR BLD: 7 % (ref 5–13)
NEUTS SEG # BLD: 6.3 K/UL (ref 1.8–8)
NEUTS SEG NFR BLD: 64 % (ref 32–75)
NRBC # BLD: 0 K/UL (ref 0–0.01)
NRBC BLD-RTO: 0 PER 100 WBC
PLATELET # BLD AUTO: 252 K/UL (ref 150–400)
PMV BLD AUTO: 9.4 FL (ref 8.9–12.9)
POTASSIUM SERPL-SCNC: 3.3 MMOL/L (ref 3.5–5.1)
RBC # BLD AUTO: 3.87 M/UL (ref 3.8–5.2)
SAMPLES BEING HELD,HOLD: NORMAL
SARS-COV-2, COV2: NOT DETECTED
SODIUM SERPL-SCNC: 140 MMOL/L (ref 136–145)
SOURCE, COVRS: NORMAL
SPECIMEN SOURCE, FCOV2M: NORMAL
SPECIMEN TYPE, XMCV1T: NORMAL
WBC # BLD AUTO: 9.8 K/UL (ref 3.6–11)
WBC #/AREA STL HPF: NORMAL /HPF (ref 0–4)

## 2020-08-18 PROCEDURE — 80048 BASIC METABOLIC PNL TOTAL CA: CPT

## 2020-08-18 PROCEDURE — 99218 HC RM OBSERVATION: CPT

## 2020-08-18 PROCEDURE — 74011250637 HC RX REV CODE- 250/637: Performed by: NURSE PRACTITIONER

## 2020-08-18 PROCEDURE — 85379 FIBRIN DEGRADATION QUANT: CPT

## 2020-08-18 PROCEDURE — 82272 OCCULT BLD FECES 1-3 TESTS: CPT

## 2020-08-18 PROCEDURE — 74011250637 HC RX REV CODE- 250/637: Performed by: INTERNAL MEDICINE

## 2020-08-18 PROCEDURE — 74011000258 HC RX REV CODE- 258: Performed by: FAMILY MEDICINE

## 2020-08-18 PROCEDURE — 74011250637 HC RX REV CODE- 250/637: Performed by: FAMILY MEDICINE

## 2020-08-18 PROCEDURE — 87177 OVA AND PARASITES SMEARS: CPT

## 2020-08-18 PROCEDURE — 74011250636 HC RX REV CODE- 250/636: Performed by: FAMILY MEDICINE

## 2020-08-18 PROCEDURE — 96375 TX/PRO/DX INJ NEW DRUG ADDON: CPT

## 2020-08-18 PROCEDURE — 36415 COLL VENOUS BLD VENIPUNCTURE: CPT

## 2020-08-18 PROCEDURE — 89055 LEUKOCYTE ASSESSMENT FECAL: CPT

## 2020-08-18 PROCEDURE — 96376 TX/PRO/DX INJ SAME DRUG ADON: CPT

## 2020-08-18 PROCEDURE — 85025 COMPLETE CBC W/AUTO DIFF WBC: CPT

## 2020-08-18 RX ORDER — POTASSIUM CHLORIDE 750 MG/1
40 TABLET, FILM COATED, EXTENDED RELEASE ORAL
Status: COMPLETED | OUTPATIENT
Start: 2020-08-18 | End: 2020-08-18

## 2020-08-18 RX ORDER — OXYCODONE HYDROCHLORIDE 5 MG/1
5 TABLET ORAL
Status: DISCONTINUED | OUTPATIENT
Start: 2020-08-18 | End: 2020-08-19 | Stop reason: HOSPADM

## 2020-08-18 RX ADMIN — FINASTERIDE 5 MG: 5 TABLET, FILM COATED ORAL at 10:27

## 2020-08-18 RX ADMIN — METOPROLOL SUCCINATE 100 MG: 50 TABLET, EXTENDED RELEASE ORAL at 09:42

## 2020-08-18 RX ADMIN — SODIUM CHLORIDE 75 ML/HR: 9 INJECTION, SOLUTION INTRAVENOUS at 01:10

## 2020-08-18 RX ADMIN — PIPERACILLIN AND TAZOBACTAM 3.38 G: 3; .375 INJECTION, POWDER, LYOPHILIZED, FOR SOLUTION INTRAVENOUS at 18:15

## 2020-08-18 RX ADMIN — THERA TABS 1 TABLET: TAB at 09:42

## 2020-08-18 RX ADMIN — PIPERACILLIN AND TAZOBACTAM 3.38 G: 3; .375 INJECTION, POWDER, LYOPHILIZED, FOR SOLUTION INTRAVENOUS at 09:42

## 2020-08-18 RX ADMIN — Medication 10 ML: at 06:05

## 2020-08-18 RX ADMIN — PIPERACILLIN AND TAZOBACTAM 3.38 G: 3; .375 INJECTION, POWDER, LYOPHILIZED, FOR SOLUTION INTRAVENOUS at 01:36

## 2020-08-18 RX ADMIN — POTASSIUM CHLORIDE 40 MEQ: 750 TABLET, FILM COATED, EXTENDED RELEASE ORAL at 09:42

## 2020-08-18 RX ADMIN — ONDANSETRON 4 MG: 2 INJECTION INTRAMUSCULAR; INTRAVENOUS at 13:34

## 2020-08-18 RX ADMIN — OXYCODONE HYDROCHLORIDE 5 MG: 5 TABLET ORAL at 10:35

## 2020-08-18 NOTE — PROGRESS NOTES
Bedside shift change report given to [address] [place] [address] (oncoming nurse) by Jomar Crowder (offgoing nurse). Report included the following information SBAR, Kardex, ED Summary, Intake/Output, MAR, Recent Results and Cardiac Rhythm NSR. TRANSFER - IN REPORT:    Verbal report received from [facility] on [facility]  being received from ED for routine progression of care      Report consisted of patients Situation, Background, Assessment and   Recommendations(SBAR). Information from the following report(s) SBAR, Kardex, ED Summary, Intake/Output, MAR, Recent Results and Cardiac Rhythm NSR was reviewed with the receiving nurse. Opportunity for questions and clarification was provided. Assessment completed upon patients arrival to unit and care assumed. Primary Nurse Norva Habermann and Homero Mary RN performed a dual skin assessment on this patient No impairment noted  Foreign score is 19. Problem: Falls - Risk of  Goal: *Absence of Falls  Description: Document Rannie Rook Fall Risk and appropriate interventions in the flowsheet. Outcome: Progressing Towards Goal  Note: Pt remains free of falls during admission. Call bell and frequently used items within reach. Bedside table within reach. Patient provided non skid socks and instructed to call out for nurse when in need of assistance. Problem: Upper and Lower GI Bleed: Day 1  Goal: Treatments/Interventions/Procedures  Outcome: Progressing Towards Goal  Note: Pt NPO for any potential procedures during the day. Q8 H&Hs drawn. VSS, no s/sx of bleeding.      Last 3 Recorded Weights in this Encounter    08/18/20 0050   Weight: 87.1 kg (192 lb 0.3 oz)

## 2020-08-18 NOTE — CONSULTS
[address] [place]  [address] [id number]        GASTROENTEROLOGY CONSULTATION NOTE  Will Priscilla Leggettg  [phone] office  [phone] NP/PA in-hospital cell phone M-F until 4:30PM  After 5PM or on weekends, please call  for physician on call        NAME:  Brenda Clarke   :   1940   MRN:   [id number]       Referring Physician: Dr. Manjeet Wong Date: 2020 9:26 AM    Chief Complaint: low back and lower abdominal pain      History of Present Illness:  Patient is a 78 y.o. who is seen in consultation at the request of Dr. Les Arellano for abdominal pain/diarrhea/rectal bleeding. Patient has a past medical history significant for hypertension, diabetes, and breast cancer. She presented to the ED with complaints of lower abdominal pain and low back pain. She was admitted to the hospital on 20 for LLQ abdominal pain/GI bleed/fever. Patient complains of lower back, bilateral hip, and lower abdominal pain for the last approximately 1 month. Pain seems to be concentrated to the low back. She reports some radiation into the legs. No nausea, reflux, or vomiting. No diarrhea or constipation. Patient reports having a bowel movement every 1-3 days that is usually formed. She reports 1 episode of bright red blood per rectum yesterday morning.    + NSAID use. No anticoagulation. Rare alcohol use. No tobacco use. History of appendectomy. EGD (3/3/17) by Dr. Aisha Cruz for dysphagia/odynophagia/GERD showed a focal erosion at the [facility] - Schatzki's ring, dilated; 4 cm hiatal hernia; normal duodenum. Pathology of the GE junction showed junctional mucosa with chronic reflux associated changes; negative for intestinal metaplasia, dysplasia, or malignancy. Colonoscopy (16) by Dr. Varsha Campos for lower rectal bleeding showed pan-diverticulosis; left-sided patchy erythema; small internal hemorrhoids. A repeat screening colonoscopy was recommended in 5 years. Pathology of the left colon showed focal nonspecific active colitis. I have reviewed the emergency room note, hospital admission note, notes by all other clinicians who have seen the patient during this hospitalization to date. I have reviewed the problem list and the reason for this hospitalization. I have reviewed the allergies and the medications the patient was taking at home prior to this hospitalization. 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 Home Medications:  Prior to Admission Medications   Prescriptions Last Dose Informant Patient Reported? Taking? BIOTIN PO   Yes Yes   Sig: Take 5,000 mcg by mouth two (2) times a day. VIT C/VIT E/LUTEIN/MIN/OMEGA-3 (OCUVITE PO)   Yes Yes   Sig: Take 1 Tab by mouth two (2) times a day. Takes one po twice daily. aspirin 81 mg tablet   Yes Yes   Sig: Take 81 mg by mouth daily.      calcium-cholecalciferol, D3, (CALTRATE 600+D) tablet   Yes Yes   Sig: Take 1 Tab by mouth daily. cyclobenzaprine (FLEXERIL) 5 mg tablet   Yes Yes   Sig: Take 5-10 mg by mouth nightly as needed for Muscle Spasm(s). finasteride (PROSCAR) 5 mg tablet   Yes Yes   Sig: Take 5 mg by mouth daily. hydroCHLOROthiazide (HYDRODIURIL) 25 mg tablet   Yes Yes   Sig: Take 25 mg by mouth daily. magnesium 250 mg tab   Yes Yes   Sig: Take 250 mg by mouth daily. metoprolol succinate (TOPROL-XL) 100 mg tablet   Yes Yes   Sig: Take 100 mg by mouth daily. rosuvastatin (CRESTOR) 20 mg tablet   Yes Yes   Sig: Take 20 mg by mouth daily.         Facility-Administered Medications: None       Hospital Medications:  Current Facility-Administered Medications   Medication Dose Route Frequency    potassium chloride SR (KLOR-CON 10) tablet 40 mEq  40 mEq Oral NOW    sodium chloride (NS) flush 5-40 mL  5-40 mL IntraVENous Q8H    sodium chloride (NS) flush 5-40 mL  5-40 mL IntraVENous PRN    0.9% sodium chloride infusion  75 mL/hr IntraVENous CONTINUOUS    acetaminophen (TYLENOL) tablet 650 mg  650 mg Oral Q4H PRN    ondansetron (ZOFRAN) injection 4 mg  4 mg IntraVENous Q4H PRN    piperacillin-tazobactam (ZOSYN) 3.375 g in 0.9% sodium chloride (MBP/ADV) 100 mL  3.375 g IntraVENous Q8H    finasteride (PROSCAR) tablet 5 mg  5 mg Oral DAILY    metoprolol succinate (TOPROL-XL) XL tablet 100 mg  100 mg Oral DAILY    therapeutic multivitamin (THERAGRAN) tablet 1 Tab  1 Tab Oral DAILY       Social History:  Social History     Tobacco Use    Smoking status: Never Smoker    Smokeless tobacco: Never Used   Substance Use Topics    Alcohol use: Yes     Comment: very seldom       Family History:  Family History   Problem Relation Age of Onset    Breast Cancer Mother     Heart Disease Father        Review of Systems:  Constitutional: negative fever, negative chills, negative weight loss  Eyes:   negative visual changes  ENT:   negative sore throat, tongue or lip swelling  Respiratory:  negative cough, negative dyspnea  Cards:  negative for chest pain, palpitations, lower extremity edema  GI:   See HPI  :  negative for frequency, dysuria  Integument:  negative for rash and pruritus  Heme:  negative for easy bruising and gum/nose bleeding  Musculoskeletal:negative for myalgias, + back pain, negative muscle weakness  Neuro:    negative for headaches, dizziness  Psych: negative for feelings of anxiety, depression     Objective:     Patient Vitals for the past 8 hrs:   BP Temp Pulse Resp SpO2   08/18/20 0712 140/53 98.3 °F (36.8 °C) 88 15 93 %   08/18/20 0303 156/50 98.3 °F (36.8 °C) 88 19 96 %     No intake/output data recorded. 08/16 1901 - 08/18 0700  In: 100 [I.V.:100]  Out: -     EXAM:     CONST:  Pleasant female lying in bed, no acute distress   NEURO:  Alert and oriented   HEENT: EOMI, no scleral icterus   LUNGS: No respiratory distress   CARD:  S1 S2   ABD:  Soft, non distended, no tenderness, no rebound, no guarding. + Bowel sounds. EXT:  Warm   PSYCH: Not anxious or agitated     Data Review     Recent Labs     08/18/20  0307 08/17/20  1856 08/17/20  0944   WBC 9.8  --  17.5*   HGB 10.4* 10.1* 11.7   HCT 32.6* 32.2* 36.5     --  292     Recent Labs     08/18/20  0307 08/17/20  0944    137   K 3.3* 3.7    104   CO2 22 24   BUN 17 25*   CREA 0.70 0.88   * 123*   CA 8.7 9.8     Recent Labs     08/17/20  0944   *   TP 7.6   ALB 3.2*   GLOB 4.4*     No results for input(s): INR, PTP, APTT, INREXT in the last 72 hours. Colonoscopy (4/27/16) by Dr. Tuan Das for lower rectal bleeding showed pan-diverticulosis; left-sided patchy erythema; small internal hemorrhoids. A repeat screening colonoscopy was recommended in 5 years. Pathology of the left colon showed focal nonspecific active colitis. Assessment:   · Lower abdominal pain: WBC 9.8, Hgb 10.4, platelets 146, LFTs unremarkable (except alkaline phosphatase 170), lactic acid 1.1. CT abdomen/pelvis with IV contrast (8/17/20): no acute abdominal process; hiatal hernia; hepatic steatosis. Colonoscopy in 2016 as above. · Hematochezia: hemoccult negative. Stool studies pending. · Low back pain  · Diabetes mellitus type II  · Hypertension  · Hyperlipidemia  · COVID-19 negative     Patient Active Problem List   Diagnosis Code    Rectal bleeding K62.5    Colitis K52.9    Hypotension due to medication I95.2    Hyperlipidemia E78.5    Hx of essential hypertension Z86.79    Abdominal pain R10.9     Plan:     · Continue antibiotics  · Supportive measures  · Stool studies if able  · Next consider colonoscopy  · Patient was discussed with and will be seen by Dr. Carmencita Phillips  · Thank you for allowing me to participate in care of Devaughn Breath     Signed By: Rachel Alarcon     8/18/2020  9:26 AM     Gastroenterology Attending Physician attestation statement and comments. This patient was seen and examined by me in a face-to-face visit today. I reviewed the medical record including lab work, imaging and other provider notes. I confirmed the history as described above. I spoke to the patient, reviewed the medical record including lab work, imaging and other provider notes. I discussed this case in detail with angel moreno. I formulated an  assessment of this patient and developed a treatment plan. I agree with the above consultation note. I agree with the history, exam and assessment and plan as outlined in the note.   I would like to add the following:   Abdomen is soft, non tender  Her pain is mainly in lower back with radiation to lower abdomen and hips/legs  CT abdomen is normal  No clear explanation to her elevated WBC and fever, improved on IV abx  Suspect the source of her problems is from her lumbar spine, consider neurosurgery consult  Will follow  No indication or benefit for colonoscopy at this time  Will follow

## 2020-08-18 NOTE — ACP (ADVANCE CARE PLANNING)
Advance Care Planning     Advance Care Planning Activator (Inpatient)  Conversation Note      Date of ACP Conversation: 08/18/20     Conversation Conducted with:   Patient with Decision Making Capacity    ACP Activator: Elba Chavez RN    *When Decision Maker makes decisions on behalf of the incapacitated patient: Decision Maker is asked to consider and make decisions based on patient values, known preferences, or best interests. Health Care Decision Maker:    Current Designated Health Care Decision Maker:   Primary Decision Maker: Tee Bentley - Child - [phone]    Secondary Decision Maker: Ced Cabrera Daughter - [phone]  (If there is a [address] named in the [facility] Makers\" box in the ACP activity, but it is not visible above, be sure to open that field and then select the health care decision maker relationship (ie \"primary\") in the blank space to the right of the name.) Validate  this information as still accurate & up-to-date; edit [place] field as needed.)    Note: Assess and validate information in current ACP documents, as indicated. If no Decision Maker listed above or available through scanned documents, then:    If no Authorized Decision Maker has previously been identified, then patient chooses [place]:  \"Who would you like to name as your primary health care decision-maker? \"    Name: Rk Crews   Relationship: son Phone number: [phone]  Renetta Willis this person be reached easily? \" YES  \"Who would you like to name as your back-up decision maker? \"   Name: Eileen Daniels  Relationship: daughter Phone number: [phone]  Renetta Willis this person be reached easily? \" YES    Note: If the relationship of these Decision-Makers to the patient does NOT follow your state's Next of Kin hierarchy, recommend that patient complete ACP document that meets state-specific requirements to allow them to act on the patient's behalf when appropriate. Care Preferences    Ventilation: \"If you were in your present state of health and suddenly became very ill and were unable to breathe on your own, what would your preference be about the use of a ventilator (breathing machine) if it were available to you? \"      If patient would desire the use of a ventilator (breathing machine), answer \"yes\", if not \"no\":yes    \"If your health worsens and it becomes clear that your chance of recovery is unlikely, what would your preference be about the use of a ventilator (breathing machine) if it were available to you? \"     Would the patient desire the use of a ventilator (breathing machine)? YES      Resuscitation  \"CPR works best to restart the heart when there is a sudden event, like a heart attack, in someone who is otherwise healthy. Unfortunately, CPR does not typically restart the heart for people who have serious health conditions or who are very sick. \"    \"In the event your heart stopped as a result of an underlying serious health condition, would you want attempts to be made to restart your heart (answer \"yes\" for attempt to resuscitate) or would you prefer a natural death (answer \"no\" for do not attempt to resuscitate)? \" yes    [x] Yes  [] No   Educated Patient / Doc Gist regarding differences between Advance Directives and portable DNR orders.     Length of ACP Conversation in minutes:  10     Conversation Outcomes:  [x] ACP discussion completed  [] Existing advance directive reviewed with patient; no changes to patient's previously recorded wishes     [] New Advance Directive completed   [] Portable Do Not Resuscitate prepared for Provider review and signature  [] POLST/POST/MOLST/MOST prepared for Provider review and signature      Follow-up plan:    [] Schedule follow-up conversation to continue planning  [] Referred individual to Provider for additional questions/concerns   [] Advised patient/agent/surrogate to review completed ACP document and update if needed with changes in condition, patient preferences or care setting     [] This note routed to one or more involved healthcare providers

## 2020-08-18 NOTE — PROGRESS NOTES
[facility]  Hospitalist Group                                                                                          Hospitalist Progress Note  Hiram Phelps NP  Answering service: [phone] or [phone] from in house phone        Date of Service:  2020  NAME:  Dinorah Thomason  :  1940  MRN:  [id number]      Admission Summary:   Patient reports that she has had chronic low back pain and has been seeing her primary care physician for the same. Patient reports recently she started having increased lower back pain associated with pain in the lower part of her abdomen which was crampy in nature. Patient reports that she has history of diverticulosis, was started on antibiotics about 2 weeks back, completed the antibiotic course for 1 week, continue to have pain and discomfort. She woke up this morning and had bright red blood in her stool, reports that she had some diarrhea last night. She also had a fever of 102.5 this morning got concerned and decided to come to the hospital.  Patient reports that she has a poor appetite and feels nauseous. Denies any other complaints or problems. She denies any bladder or bowel incontinence or any saddle anesthesia. The patient denies any fever, chills, chest pain, cough, congestion, recent illness, palpitations, or dysuria. Interval history / Subjective:    Seen and examined patient. Sitting up in bed. States that she is feeling ok. Had one bowel movement this morning that was \"normal\" and no blood noted. Denies any abdominal pain. Became nauseated today after taking medication, relieved by Zofran. Tolerating clear liquids and agrees to advancing diet for dinner. Has been having lower back pain for approximately one month. She was picking up a box from under the stairs, she squatted down then twisted the wrong way when getting back up. The pain increases with activity. She states \"sometimes the pain is rated a 15\".   Frequently it radiates down her legs. Denies any numbness or tingling. Sensation intact. Relieved by taking Tylenol and sitting down. No acute distress noted. No overnight events noted. Discussed with RN. Denies any dizziness, syncope, shortness of breath, and chest pain or tightness. Assessment & Plan:     Abdominal pain   - Intermittent   - GI following   - Tolerating clear liquids, will advance to GI lite for dinner.   - Continue IV fluids     Diabetes mellitus type 2   - Controlled by diet at home   - Glucose monitor      Hypertension   - Stable   - Continue Metoprolol     Lower back pain   - CT showing Spinal canal stenosis L2-3 to a lesser degree. L3-4 mild spinal stenosis   - Pain control   - Will follow up with ortho outpatient.   - PT consult     Code status: Full   DVT prophylaxis: SCDs    Care Plan discussed with: Patient/Family and Nurse  Anticipated Disposition: Home w/Family  Anticipated Discharge: Less than 24 hours     Hospital Problems  Date Reviewed: 6/10/2019          Codes Class Noted POA    Abdominal pain ICD-10-CM: R10.9  ICD-9-CM: 789.00  8/17/2020 Unknown                Review of Systems:   A comprehensive review of systems was negative except for that written in the HPI. Vital Signs:    Last 24hrs VS reviewed since prior progress note. Most recent are:  Visit Vitals  /46 (BP 1 Location: Right arm, BP Patient Position: At rest)   Pulse 78   Temp 97.2 °F (36.2 °C)   Resp 19   Ht 5' 2\" (1.575 m)   Wt 87.1 kg (192 lb 0.3 oz)   SpO2 95%   BMI 35.12 kg/m²         Intake/Output Summary (Last 24 hours) at 8/18/2020 1713  Last data filed at 8/18/2020 1600  Gross per 24 hour   Intake 460 ml   Output    Net 460 ml        Physical Examination:             Constitutional:  No acute distress, cooperative, pleasant, answers questions    ENT:  Oral mucosa moist, oropharynx benign. Resp:  CTA bilaterally. No wheezing/rhonchi/rales.  No accessory muscle use   CV:  Regular rhythm, normal rate, no murmurs, gallops, rubs    GI:  Soft, non distended, non tender. normoactive bowel sounds, no hepatosplenomegaly     Musculoskeletal:  No edema, warm, 2+ pulses throughout    Neurologic:  Moves all extremities. AAOx3, CN II-XII reviewed, follows commands. Psych:  Good insight, Not anxious nor agitated. Data Review:    Review and/or order of clinical lab test  Review and/or order of tests in the radiology section of CPT  Review and/or order of tests in the medicine section of CPT      Labs:     Recent Labs     08/18/20  0307 08/17/20  1856 08/17/20  0944   WBC 9.8  --  17.5*   HGB 10.4* 10.1* 11.7   HCT 32.6* 32.2* 36.5     --  292     Recent Labs     08/18/20  0307 08/17/20  0944    137   K 3.3* 3.7    104   CO2 22 24   BUN 17 25*   CREA 0.70 0.88   * 123*   CA 8.7 9.8     Recent Labs     08/17/20  0944   ALT 54   *   TBILI 0.3   TP 7.6   ALB 3.2*   GLOB 4.4*     No results for input(s): INR, PTP, APTT, INREXT in the last 72 hours. Recent Labs     08/17/20  0945   FERR 45      No results found for: FOL, RBCF   No results for input(s): PH, PCO2, PO2 in the last 72 hours. No results for input(s): CPK, CKNDX, TROIQ in the last 72 hours.     No lab exists for component: CPKMB  Lab Results   Component Value Date/Time    Cholesterol, total 183 06/16/2009 08:15 AM    HDL Cholesterol 44 06/16/2009 08:15 AM    LDL, calculated 101.6 (H) 06/16/2009 08:15 AM    Triglyceride 187 06/16/2009 08:15 AM    CHOL/HDL Ratio 4.2 06/16/2009 08:15 AM     Lab Results   Component Value Date/Time    Glucose (POC) 106 (H) 06/10/2019 06:08 AM    Glucose (POC) 128 (H) 06/09/2019 10:09 PM    Glucose (POC) 110 (H) 06/09/2019 07:12 AM    Glucose (POC) 129 (H) 06/08/2019 09:34 PM    Glucose (POC) 116 (H) 06/08/2019 04:33 PM     Lab Results   Component Value Date/Time    Color YELLOW/STRAW 08/17/2020 12:25 PM    Appearance CLOUDY (A) 08/17/2020 12:25 PM    Specific gravity 1.010 08/17/2020 12:25 PM    pH (UA) 7.5 08/17/2020 12:25 PM    Protein Negative 08/17/2020 12:25 PM    Glucose Negative 08/17/2020 12:25 PM    Ketone Negative 08/17/2020 12:25 PM    Bilirubin Negative 08/17/2020 12:25 PM    Urobilinogen 0.2 08/17/2020 12:25 PM    Nitrites Negative 08/17/2020 12:25 PM    Leukocyte Esterase TRACE (A) 08/17/2020 12:25 PM    Epithelial cells MANY (A) 08/17/2020 12:25 PM    Bacteria Negative 08/17/2020 12:25 PM    WBC 5-10 08/17/2020 12:25 PM    RBC 10-20 08/17/2020 12:25 PM         Medications Reviewed:     Current Facility-Administered Medications   Medication Dose Route Frequency    oxyCODONE IR (ROXICODONE) tablet 5 mg  5 mg Oral Q4H PRN    sodium chloride (NS) flush 5-40 mL  5-40 mL IntraVENous Q8H    sodium chloride (NS) flush 5-40 mL  5-40 mL IntraVENous PRN    0.9% sodium chloride infusion  75 mL/hr IntraVENous CONTINUOUS    acetaminophen (TYLENOL) tablet 650 mg  650 mg Oral Q4H PRN    ondansetron (ZOFRAN) injection 4 mg  4 mg IntraVENous Q4H PRN    piperacillin-tazobactam (ZOSYN) 3.375 g in 0.9% sodium chloride (MBP/ADV) 100 mL  3.375 g IntraVENous Q8H    finasteride (PROSCAR) tablet 5 mg  5 mg Oral DAILY    metoprolol succinate (TOPROL-XL) XL tablet 100 mg  100 mg Oral DAILY    therapeutic multivitamin (THERAGRAN) tablet 1 Tab  1 Tab Oral DAILY     ______________________________________________________________________  EXPECTED LENGTH OF STAY: - - -  ACTUAL LENGTH OF STAY:          0                 Alphonso Osman NP

## 2020-08-18 NOTE — PROGRESS NOTES
1757 TRANSFER - IN REPORT:    [facility] for routine progression of care      Report consisted of patients Situation, Background, Assessment and   Recommendations(SBAR). Information from the following report(s) SBAR, ED Summary, Intake/Output, MAR, Accordion, Recent Results and Med Rec Status was reviewed with the receiving nurse. Opportunity for questions and clarification was provided. Assessment completed upon patients arrival to unit and care assumed.

## 2020-08-18 NOTE — ROUTINE PROCESS
TRANSFER - OUT REPORT: 
 
Verbal report given to Klever Camara RN(name) on Sampson Rowe  being transferred to [facility]) for routine progression of care Report consisted of patients Situation, Background, Assessment and  
Recommendations(SBAR). Information from the following report(s) SBAR, ED Summary, [facility] and Recent Results was reviewed with the receiving nurse. Lines:  
Peripheral IV 08/17/20 Right Antecubital (Active) Site Assessment Clean, dry, & intact 08/17/20 9359 Phlebitis Assessment 0 08/17/20 0951 Infiltration Assessment 0 08/17/20 0951 Dressing Status Clean, dry, & intact 08/17/20 5118 Opportunity for questions and clarification was provided. Patient transported with: 
 [facility]

## 2020-08-18 NOTE — PROGRESS NOTES
Reason for Admission:   Admitted from home with complaints of abdominal pain and fever. Has a history of diverticulosis. RUR Score:   Observation                  Plan for utilizing home health:  No needs identified at this time. She lives with son and reports being independent with all ADLs. PCP: First and Last name: Laurita Leon    Name of Practice:    Are you a current patient: Yes/No: Yes  Approximate date of last visit: 8/22  Can you participate in a virtual visit with your PCP: NO                    Current Advanced Directive/Advance Care Plan: does not have and declines completing. Son Naila Sullivan and daughter Randy Bridges are designated decision maker. Transition of Care Plan:   COVID-negative  GI consult for abdominal pain  May need colonoscopy  Anticipate that she will discharge home with family once medically stable. Care Management Interventions  PCP Verified by CM: Yes(8/2020)  Mode of Transport at Discharge: (private car)  Current Support Network: (Son lives with patient in her home)  Confirm Follow Up Transport: Self  The Patient and/or Patient Representative was Provided with a Choice of Provider and Agrees with the Discharge Plan?: (Juan R Sullivan)  [address] Provided?: No  Observation notice provided in writing to patient and/or caregiver as well as verbal explanation of the policy. Patients who are in outpatient status also receive the Observation notice. Reviewed with patient by phone.     Humaira Mcneil RN CRM  Ext [phone]

## 2020-08-19 VITALS
RESPIRATION RATE: 16 BRPM | TEMPERATURE: 97.7 F | HEART RATE: 76 BPM | WEIGHT: 192.02 LBS | OXYGEN SATURATION: 92 % | HEIGHT: 62 IN | SYSTOLIC BLOOD PRESSURE: 131 MMHG | BODY MASS INDEX: 35.34 KG/M2 | DIASTOLIC BLOOD PRESSURE: 71 MMHG

## 2020-08-19 LAB
ANION GAP SERPL CALC-SCNC: 5 MMOL/L (ref 5–15)
BASOPHILS # BLD: 0.1 K/UL (ref 0–0.1)
BASOPHILS NFR BLD: 1 % (ref 0–1)
BUN SERPL-MCNC: 16 MG/DL (ref 6–20)
BUN/CREAT SERPL: 21 (ref 12–20)
CALCIUM SERPL-MCNC: 8.9 MG/DL (ref 8.5–10.1)
CHLORIDE SERPL-SCNC: 111 MMOL/L (ref 97–108)
CO2 SERPL-SCNC: 25 MMOL/L (ref 21–32)
CREAT SERPL-MCNC: 0.78 MG/DL (ref 0.55–1.02)
DIFFERENTIAL METHOD BLD: ABNORMAL
EOSINOPHIL # BLD: 0.7 K/UL (ref 0–0.4)
EOSINOPHIL NFR BLD: 8 % (ref 0–7)
ERYTHROCYTE [DISTWIDTH] IN BLOOD BY AUTOMATED COUNT: 15.7 % (ref 11.5–14.5)
GLUCOSE SERPL-MCNC: 101 MG/DL (ref 65–100)
HCT VFR BLD AUTO: 31.3 % (ref 35–47)
HGB BLD-MCNC: 10.1 G/DL (ref 11.5–16)
IMM GRANULOCYTES # BLD AUTO: 0 K/UL (ref 0–0.04)
IMM GRANULOCYTES NFR BLD AUTO: 0 % (ref 0–0.5)
LYMPHOCYTES # BLD: 2.6 K/UL (ref 0.8–3.5)
LYMPHOCYTES NFR BLD: 27 % (ref 12–49)
MCH RBC QN AUTO: 26.9 PG (ref 26–34)
MCHC RBC AUTO-ENTMCNC: 32.3 G/DL (ref 30–36.5)
MCV RBC AUTO: 83.5 FL (ref 80–99)
MONOCYTES # BLD: 0.7 K/UL (ref 0–1)
MONOCYTES NFR BLD: 7 % (ref 5–13)
NEUTS SEG # BLD: 5.4 K/UL (ref 1.8–8)
NEUTS SEG NFR BLD: 57 % (ref 32–75)
NRBC # BLD: 0 K/UL (ref 0–0.01)
NRBC BLD-RTO: 0 PER 100 WBC
PLATELET # BLD AUTO: 257 K/UL (ref 150–400)
PMV BLD AUTO: 9.3 FL (ref 8.9–12.9)
POTASSIUM SERPL-SCNC: 3.7 MMOL/L (ref 3.5–5.1)
RBC # BLD AUTO: 3.75 M/UL (ref 3.8–5.2)
SODIUM SERPL-SCNC: 141 MMOL/L (ref 136–145)
WBC # BLD AUTO: 9.6 K/UL (ref 3.6–11)

## 2020-08-19 PROCEDURE — 74011250636 HC RX REV CODE- 250/636: Performed by: FAMILY MEDICINE

## 2020-08-19 PROCEDURE — 65410000002 HC RM PRIVATE OB

## 2020-08-19 PROCEDURE — 80048 BASIC METABOLIC PNL TOTAL CA: CPT

## 2020-08-19 PROCEDURE — 74011250637 HC RX REV CODE- 250/637: Performed by: FAMILY MEDICINE

## 2020-08-19 PROCEDURE — 85025 COMPLETE CBC W/AUTO DIFF WBC: CPT

## 2020-08-19 PROCEDURE — 99218 HC RM OBSERVATION: CPT

## 2020-08-19 PROCEDURE — 97161 PT EVAL LOW COMPLEX 20 MIN: CPT

## 2020-08-19 PROCEDURE — 74011000258 HC RX REV CODE- 258: Performed by: FAMILY MEDICINE

## 2020-08-19 PROCEDURE — 36415 COLL VENOUS BLD VENIPUNCTURE: CPT

## 2020-08-19 PROCEDURE — 97116 GAIT TRAINING THERAPY: CPT

## 2020-08-19 RX ORDER — OXYCODONE HYDROCHLORIDE 5 MG/1
5 TABLET ORAL
Qty: 15 TAB | Refills: 0 | Status: SHIPPED | OUTPATIENT
Start: 2020-08-19 | End: 2020-08-24

## 2020-08-19 RX ORDER — ACETAMINOPHEN 325 MG/1
650 TABLET ORAL
Qty: 50 TAB | Refills: 0 | Status: SHIPPED
Start: 2020-08-19

## 2020-08-19 RX ORDER — OXYCODONE HYDROCHLORIDE 5 MG/1
5 TABLET ORAL
Qty: 15 TAB | Refills: 0 | Status: SHIPPED | OUTPATIENT
Start: 2020-08-19 | End: 2020-08-19

## 2020-08-19 RX ADMIN — ACETAMINOPHEN 650 MG: 325 TABLET ORAL at 08:59

## 2020-08-19 RX ADMIN — METOPROLOL SUCCINATE 100 MG: 50 TABLET, EXTENDED RELEASE ORAL at 08:54

## 2020-08-19 RX ADMIN — THERA TABS 1 TABLET: TAB at 08:54

## 2020-08-19 RX ADMIN — PIPERACILLIN AND TAZOBACTAM 3.38 G: 3; .375 INJECTION, POWDER, LYOPHILIZED, FOR SOLUTION INTRAVENOUS at 01:58

## 2020-08-19 RX ADMIN — FINASTERIDE 5 MG: 5 TABLET, FILM COATED ORAL at 08:54

## 2020-08-19 NOTE — PROGRESS NOTES
PHYSICAL THERAPY EVALUATION/DISCHARGE  Patient: Scottie Carbone (18 y.o. female)  Date: 8/19/2020  Primary Diagnosis: Abdominal pain [R10.9]       Precautions:          ASSESSMENT  Based on the objective data described below, the patient is presently modified independent with ambulation and stairs. Pt ambulated without use of AD/DME. Pt presented with initial decreased arm swing that improved with continued ambulation and cues. Pt steady with no significant LOB noted. Pt able to navigate 2 steps (number limited due to IV) with reciprocal pattern and no LOB. Pt then ambulated back to room, utilized bathroom, then transferred to chair. Pt states no significant concerns for mobility at home, NP bedside. Functional Outcome Measure: The patient scored 95/100 on the Barthel Index outcome measure which is indicative of patient is 5% dependent on others. Other factors to consider for discharge: lives with son, 4 PAT[address]     Further skilled acute physical therapy is not indicated at this time. PLAN :  Recommendation for discharge: (in order for the patient to meet his/her long term goals)  Outpatient physical therapy follow up recommended for higher level balance and back pain    This discharge recommendation:  Has been made in collaboration with the attending provider and/or case management    IF patient discharges home will need the following DME: none       SUBJECTIVE:   Patient stated I am feeling much better.     OBJECTIVE DATA SUMMARY:   HISTORY:    Past Medical History:   Diagnosis Date    Cancer ([facility] 2000    left breast - treated with surgery/chemo/radiation    Cancer ([facility]     \"something removed from face 10 yrs ago\" - skin cancer    Diabetes (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     Past Surgical History:   Procedure Laterality Date    BREAST SURGERY PROCEDURE UNLISTED      cyst removed from left breast    HX APPENDECTOMY      HX BREAST BIOPSY Bilateral     x 6 - one cancerous    HX BREAST LUMPECTOMY      left    HX CATARACT REMOVAL      HX HEENT      T & A    HX OTHER SURGICAL      colonoscopy x 1 - normal per pt    HX VASCULAR ACCESS      port-a-cath in/out       Prior level of function: independent, lives with son, driving  Personal factors and/or comorbidities impacting plan of care: PMH     Home Situation  Home Environment: Private residence  # Steps to Enter: 4  Rails to Enter: Yes  Hand Rails : Left  One/Two Story Residence: Two story, live on 1st floor  Living Alone: No  Support Systems: (son (working from home))  Patient Expects to be Discharged to[de-identified] Private residence  Current DME Used/Available at Home: None  Tub or Shower Type: Shower    EXAMINATION/PRESENTATION/DECISION MAKING:   Critical Behavior:              Hearing: Auditory  Auditory Impairment: Hard of hearing, bilateral, Hearing aid(s)  Hearing Aids/Status: With patient  Skin:  intact  Edema: none noted  Range Of Motion:  AROM: Within functional limits           PROM: Within functional limits           Strength:    Strength: Within functional limits                    Tone & Sensation:   Tone: Normal              Sensation: Intact               Coordination:  Coordination: Within functional limits  Vision:      Functional Mobility:  Bed Mobility:     Supine to Sit: Modified independent        Transfers:  Sit to Stand: Modified independent  Stand to Sit: Modified independent                       Balance:   Sitting: Intact  Standing: Intact; Without support  Ambulation/Gait Training:  Distance (ft): 200 Feet (ft)  Assistive Device: Gait belt  Ambulation - Level of Assistance: Modified independent        Gait Abnormalities: Decreased step clearance        Base of Support: Narrowed  Stance: Weight shift  Speed/Jossy: Fluctuations                        Stairs:  Number of Stairs Trained: 2  Stairs - Level of Assistance: Modified independent   Rail Use: Left       Functional Measure:  Barthel Index:    Bathin  Bladder: 10  Bowels: 10  Groomin  Dressing: 10  Feeding: 10  Mobility: 15  Stairs: 5  Toilet Use: 10  Transfer (Bed to Chair and Back): 15  Total: 95/100       The Barthel ADL Index: Guidelines  1. The index should be used as a record of what a patient does, not as a record of what a patient could do. 2. The main aim is to establish degree of independence from any help, physical or verbal, however minor and for whatever reason. 3. The need for supervision renders the patient not independent. 4. A patient's performance should be established using the best available evidence. Asking the patient, friends/relatives and nurses are the usual sources, but direct observation and common sense are also important. However direct testing is not needed. 5. Usually the patient's performance over the preceding 24-48 hours is important, but occasionally longer periods will be relevant. 6. Middle categories imply that the patient supplies over 50 per cent of the effort. 7. Use of aids to be independent is allowed. Abby Weaver., Barthel, D.W. (0807). Functional evaluation: the Barthel Index. [facility] (14)2. HIEN Moreland, Nelson Lomax., Shoshana Quiroz., [place], [address] [place] [address] (). Measuring the change indisability after inpatient rehabilitation; comparison of the responsiveness of the Barthel Index and Functional [place] Measure. Journal of Neurology, Neurosurgery, and Psychiatry, 66(4), 378-964. Emily Lopez, N.J.LETICIA, SAMEER Lacey, & Eileen Echavarria, M.A. (2004.) Assessment of post-stroke quality of life in cost-effectiveness studies: The usefulness of the Barthel Index and the EuroQoL-5D.  Quality of Life Research, 15, 317-62              Physical Therapy Evaluation Charge Determination   History Examination Presentation Decision-Making   MEDIUM  Complexity : 1-2 comorbidities / personal factors will impact the outcome/ POC  LOW Complexity : 1-2 Standardized tests and measures addressing body structure, function, activity limitation and / or participation in recreation  LOW Complexity : Stable, uncomplicated  LOW Complexity : FOTO score of       Based on the above components, the patient evaluation is determined to be of the following complexity level: LOW       Activity Tolerance:   Good  Please refer to the flowsheet for vital signs taken during this treatment. After treatment patient left in no apparent distress:   Sitting in chair, Call bell within reach and NP bedside    COMMUNICATION/EDUCATION:   The patients plan of care was discussed with: Registered nurse. Fall prevention education was provided and the patient/caregiver indicated understanding. and Patient/family have participated as able in goal setting and plan of care.     Thank you for this referral.  Domenic Cornelius, PT, DPT   Time Calculation: 16 mins

## 2020-08-19 NOTE — PROGRESS NOTES
Orders received, chart reviewed and patient evaluated by physical therapy. Pending progression with skilled acute physical therapy, recommend:  Outpatient physical therapy follow up recommended for balance and back pain    Recommend with nursing patient to complete as able in order to maintain strength, endurance and independence: OOB to chair 3x/day and ambulating with supervision. Thank you for your assistance. Full evaluation to follow.

## 2020-08-19 NOTE — PROGRESS NOTES
Bedside and Verbal shift change report given to [address] (oncoming nurse) by Isaias Vasquez RN (offgoing nurse). Report included the following information SBAR, Kardex and Recent Results. 1923: Neurosurgery consult called    1200: Discharge medications reviewed with patient and appropriate educational materials and side effects teaching were provided. I have reviewed discharge instructions with the patient. The patient verbalized understanding.       1245: pt left via wheelchair to discharge lot

## 2020-08-19 NOTE — PROGRESS NOTES
Bedside shift change report given to GETACHEW Mortensen (oncoming nurse) by Miranda Black (offgoing nurse). Report included the following information SBAR.

## 2020-08-19 NOTE — DISCHARGE INSTRUCTIONS
Discharge Instructions       PATIENT ID: Sampson Rowe  MRN: [id number]   YOB: 1940    DATE OF ADMISSION: 8/17/2020  9:06 AM    DATE OF DISCHARGE: 8/19/2020    PRIMARY CARE PROVIDER: Rubina Saldana MD     ATTENDING PHYSICIAN: Lawrence Redd  DISCHARGING PROVIDER: Lesley Rosado NP    To contact this individual call [phone] and ask the  to page. If unavailable ask to be transferred the Adult Hospitalist Department. DISCHARGE DIAGNOSES Lower abdominal pain and spinal stenosis. CONSULTATIONS: IP CONSULT TO PRIMARY CARE PROVIDER  IP CONSULT TO GASTROENTEROLOGY  IP CONSULT TO NEUROSURGERY    PROCEDURES/SURGERIES: * No surgery found *    PENDING TEST RESULTS:   At the time of discharge the following test results are still pending: ***    FOLLOW UP APPOINTMENTS:   Follow-up Information     Follow up With Specialties Details Why Contact Info    Rubina Saldana MD Internal Medicine Call For follow up appointment [address]      Tami May MD Gastroenterology Call To schedule a follow up appointment  [facility] [place] [address]      Kaleb Wright MD Neurosurgery Call To schedule an appointment in two weeks. [address] [place] [address]  [phone]             ADDITIONAL CARE RECOMMENDATIONS:     Take medications as prescribed. DIET: Resume previous diet    ACTIVITY: Activity as tolerated    WOUND CARE: None     EQUIPMENT needed: None       DISCHARGE MEDICATIONS:   See Medication Reconciliation Form    · It is important that you take the medication exactly as they are prescribed. · Keep your medication in the bottles provided by the pharmacist and keep a list of the medication names, dosages, and times to be taken in your wallet. · Do not take other medications without consulting your doctor.        NOTIFY [address] [place] [address] Drive OF THE FOLLOWING:   Fever over 101 degrees for 24 hours. Chest pain, shortness of breath, fever, chills, nausea, vomiting, diarrhea, change in mentation, falling, weakness, bleeding. Severe pain or pain not relieved by medications. Or, any other signs or symptoms that you may have questions about.       DISPOSITION:   X Home With:   OT  PT  SANDRO  RN       SNF/Inpatient Rehab/LTAC    Independent/assisted living    Hospice    Other:         Signed:   Dianna Raymundo NP  8/19/2020  10:29 AM

## 2020-08-19 NOTE — CONSULTS
Received consult.   Plan for d/c today, they would like outpatient follow up      Yes, please plan outpatient f/u with my office in ~2 weeks once she is over the current illness

## 2020-08-20 ENCOUNTER — PATIENT OUTREACH (OUTPATIENT)
Dept: CASE MANAGEMENT | Age: 80
End: 2020-08-20

## 2020-08-20 LAB
O+P SPEC MICRO: NORMAL
O+P STL CONC: NORMAL
SPECIMEN SOURCE: NORMAL

## 2020-08-20 NOTE — PROGRESS NOTES
Patient was admitted to [facility] on 2020 and discharged on 2020 for abdominal pain. Patient was contacted within one business days of discharge. Top Discharge Challenges to be reviewed by the provider   Additional needs identified to be addressed with provider no  none  Discussed COVID-19 related testing which was available at this time. Test results were negative. Patient informed of results, if available? yes   Method of communication with provider : none       Advance Care Planning:   Does patient have an Advance Directive:  not on file     Inpatient Readmission Risk score:   Was this a readmission? no     Patients top risk factors for readmission: functional physical ability-lower back pain  Interventions to address risk factors: participating O/P PT 20    Care Transition Nurse (CTN) contacted the patient by telephone to perform post hospital discharge assessment. Verified name and  with patient as identifiers. Provided introduction to self, and explanation of the CTN role. CTN reviewed discharge instructions, medical action plan and red flags with patient who verbalized understanding. Patient given an opportunity to ask questions and does not have any further questions or concerns at this time. The patient agrees to contact the PCP office for questions related to their healthcare. Medication reconciliation was performed with patient, who verbalizes understanding of administration of home medications. Advised obtaining a 90-day supply of all daily and as-needed medications. Referral to Pharm D needed: no     Home Health/Outpatient orders at discharge: none    Durable Medical Equipment ordered at discharge: None    Covid Risk Education    Patient has following risk factors of: no known risk factors. Education provided regarding infection prevention, and signs and symptoms of COVID-19 and when to seek medical attention with patient who verbalized understanding.  Discussed exposure protocols and quarantine From CDC: Are you at higher risk for severe illness?  and given an opportunity for questions and concerns. The patient agrees to contact the COVID-19 hotline [phone] or PCP office for questions related to COVID-19. For more information on steps you can take to protect yourself, see CDC's How to Protect Yourself     Patient/family/caregiver given information for GetWell Loop and agrees to enroll no    Discussed follow-up appointments. If no appointment was previously scheduled, appointment scheduling offered: [facility] follow up appointment(s): No future appointments. Non-[facility] follow up appointment(s): patient has placed call to GI and Neurosurgeon, waiting for return call  Plan for follow-up call in 7-10 days based on severity of symptoms and risk factors. CTN provided contact information for future needs. Goals Addressed                 This Visit's Progress     Attend follow up appointments on schedule          08/20/20   Has O/P PT scheduled tomorrow with [facility]   Patient contacted GI and Neurosurgeon for follow up appts, waiting for return call       Understands red flags post discharge.           08/20/20   Reports she feels better   Slept well last night   Notes mild abdominal/back pain   Taking Tylenol with some relief   Tolerating PO   No bowel / bladder concerns   Reports steady gait without the use of assistive devices

## 2020-08-22 LAB
BACTERIA SPEC CULT: NORMAL
SERVICE CMNT-IMP: NORMAL

## 2020-08-28 ENCOUNTER — PATIENT OUTREACH (OUTPATIENT)
Dept: CASE MANAGEMENT | Age: 80
End: 2020-08-28

## 2020-09-21 ENCOUNTER — PATIENT OUTREACH (OUTPATIENT)
Dept: CASE MANAGEMENT | Age: 80
End: 2020-09-21

## 2020-09-21 NOTE — PROGRESS NOTES
Patient has graduated from the Transitions of Care Coordination  program on 9/21/2020. Patient/family has the ability to self-manage at this time Care management goals have been completed. Patient was not referred to the [place] team for further management. Goals Addressed                 This Visit's Progress     COMPLETED: Attend follow up appointments on schedule          08/20/20   Has O/P PT scheduled tomorrow with [facility]   Patient contacted GI and Neurosurgeon for follow up appts, waiting for return call       COMPLETED: Understands red flags post discharge. 08/20/20   Reports she feels better   Slept well last night   Notes mild abdominal/back pain   Taking Tylenol with some relief   Tolerating PO   No bowel / bladder concerns   Reports steady gait without the use of assistive devices            Patient has Care Transition Nurse's contact information for any further questions, concerns, or needs. Patients upcoming visits:  No future appointments.

## 2020-11-02 ENCOUNTER — HOSPITAL ENCOUNTER (OUTPATIENT)
Dept: PREADMISSION TESTING | Age: 80
Discharge: HOME | End: 2020-11-02
Payer: MEDICARE

## 2020-11-02 ENCOUNTER — TRANSCRIBE ORDER (OUTPATIENT)
Dept: REGISTRATION | Age: 80
End: 2020-11-02

## 2020-11-02 DIAGNOSIS — Z01.812 PRE-PROCEDURE LAB EXAM: Primary | ICD-10-CM

## 2020-11-02 DIAGNOSIS — Z01.812 PRE-PROCEDURE LAB EXAM: ICD-10-CM

## 2020-11-02 PROCEDURE — 87635 SARS-COV-2 COVID-19 AMP PRB: CPT

## 2020-11-03 LAB — SARS-COV-2, COV2NT: NOT DETECTED

## 2020-11-05 ENCOUNTER — ANESTHESIA (OUTPATIENT)
Dept: ENDOSCOPY | Age: 80
End: 2020-11-05
Payer: MEDICARE

## 2020-11-05 ENCOUNTER — ANESTHESIA EVENT (OUTPATIENT)
Dept: ENDOSCOPY | Age: 80
End: 2020-11-05
Payer: MEDICARE

## 2020-11-05 ENCOUNTER — HOSPITAL ENCOUNTER (OUTPATIENT)
Age: 80
Setting detail: OUTPATIENT SURGERY
Discharge: HOME | End: 2020-11-05
Attending: INTERNAL MEDICINE | Admitting: INTERNAL MEDICINE
Payer: MEDICARE

## 2020-11-05 VITALS
OXYGEN SATURATION: 99 % | HEART RATE: 94 BPM | WEIGHT: 180 LBS | HEIGHT: 62 IN | BODY MASS INDEX: 33.13 KG/M2 | SYSTOLIC BLOOD PRESSURE: 157 MMHG | RESPIRATION RATE: 19 BRPM | TEMPERATURE: 98.4 F | DIASTOLIC BLOOD PRESSURE: 75 MMHG

## 2020-11-05 PROCEDURE — 88305 TISSUE EXAM BY PATHOLOGIST: CPT

## 2020-11-05 PROCEDURE — 76040000007: Performed by: INTERNAL MEDICINE

## 2020-11-05 PROCEDURE — 77030021593 HC FCPS BIOP ENDOSC BSC -A: Performed by: INTERNAL MEDICINE

## 2020-11-05 PROCEDURE — 74011250636 HC RX REV CODE- 250/636: Performed by: NURSE ANESTHETIST, CERTIFIED REGISTERED

## 2020-11-05 PROCEDURE — 76060000032 HC ANESTHESIA 0.5 TO 1 HR: Performed by: INTERNAL MEDICINE

## 2020-11-05 RX ORDER — SODIUM CHLORIDE 0.9 % (FLUSH) 0.9 %
5-40 SYRINGE (ML) INJECTION AS NEEDED
Status: DISCONTINUED | OUTPATIENT
Start: 2020-11-05 | End: 2020-11-05 | Stop reason: HOSPADM

## 2020-11-05 RX ORDER — FLUMAZENIL 0.1 MG/ML
0.2 INJECTION INTRAVENOUS
Status: DISCONTINUED | OUTPATIENT
Start: 2020-11-05 | End: 2020-11-05 | Stop reason: HOSPADM

## 2020-11-05 RX ORDER — PROPOFOL 10 MG/ML
INJECTION, EMULSION INTRAVENOUS AS NEEDED
Status: DISCONTINUED | OUTPATIENT
Start: 2020-11-05 | End: 2020-11-05 | Stop reason: HOSPADM

## 2020-11-05 RX ORDER — MIDAZOLAM HYDROCHLORIDE 1 MG/ML
.25-5 INJECTION, SOLUTION INTRAMUSCULAR; INTRAVENOUS
Status: DISCONTINUED | OUTPATIENT
Start: 2020-11-05 | End: 2020-11-05 | Stop reason: HOSPADM

## 2020-11-05 RX ORDER — EPINEPHRINE 0.1 MG/ML
1 INJECTION INTRACARDIAC; INTRAVENOUS
Status: DISCONTINUED | OUTPATIENT
Start: 2020-11-05 | End: 2020-11-05 | Stop reason: HOSPADM

## 2020-11-05 RX ORDER — NALOXONE HYDROCHLORIDE 0.4 MG/ML
0.4 INJECTION, SOLUTION INTRAMUSCULAR; INTRAVENOUS; SUBCUTANEOUS
Status: DISCONTINUED | OUTPATIENT
Start: 2020-11-05 | End: 2020-11-05 | Stop reason: HOSPADM

## 2020-11-05 RX ORDER — SODIUM CHLORIDE 0.9 % (FLUSH) 0.9 %
5-40 SYRINGE (ML) INJECTION EVERY 8 HOURS
Status: DISCONTINUED | OUTPATIENT
Start: 2020-11-05 | End: 2020-11-05 | Stop reason: HOSPADM

## 2020-11-05 RX ORDER — DEXTROMETHORPHAN/PSEUDOEPHED 2.5-7.5/.8
1.2 DROPS ORAL
Status: DISCONTINUED | OUTPATIENT
Start: 2020-11-05 | End: 2020-11-05 | Stop reason: HOSPADM

## 2020-11-05 RX ORDER — ATROPINE SULFATE 0.1 MG/ML
0.5 INJECTION INTRAVENOUS
Status: DISCONTINUED | OUTPATIENT
Start: 2020-11-05 | End: 2020-11-05 | Stop reason: HOSPADM

## 2020-11-05 RX ORDER — SODIUM CHLORIDE 9 MG/ML
INJECTION, SOLUTION INTRAVENOUS
Status: DISCONTINUED | OUTPATIENT
Start: 2020-11-05 | End: 2020-11-05 | Stop reason: HOSPADM

## 2020-11-05 RX ORDER — FENTANYL CITRATE 50 UG/ML
25-200 INJECTION, SOLUTION INTRAMUSCULAR; INTRAVENOUS
Status: DISCONTINUED | OUTPATIENT
Start: 2020-11-05 | End: 2020-11-05 | Stop reason: HOSPADM

## 2020-11-05 RX ORDER — SODIUM CHLORIDE 9 MG/ML
50 INJECTION, SOLUTION INTRAVENOUS CONTINUOUS
Status: DISCONTINUED | OUTPATIENT
Start: 2020-11-05 | End: 2020-11-05 | Stop reason: HOSPADM

## 2020-11-05 RX ADMIN — PROPOFOL 50 MG: 10 INJECTION, EMULSION INTRAVENOUS at 10:33

## 2020-11-05 RX ADMIN — PROPOFOL 50 MG: 10 INJECTION, EMULSION INTRAVENOUS at 10:45

## 2020-11-05 RX ADMIN — PROPOFOL 100 MG: 10 INJECTION, EMULSION INTRAVENOUS at 10:26

## 2020-11-05 RX ADMIN — PROPOFOL 50 MG: 10 INJECTION, EMULSION INTRAVENOUS at 10:29

## 2020-11-05 RX ADMIN — PROPOFOL 50 MG: 10 INJECTION, EMULSION INTRAVENOUS at 10:41

## 2020-11-05 RX ADMIN — PROPOFOL 50 MG: 10 INJECTION, EMULSION INTRAVENOUS at 10:53

## 2020-11-05 RX ADMIN — PROPOFOL 50 MG: 10 INJECTION, EMULSION INTRAVENOUS at 10:47

## 2020-11-05 RX ADMIN — SODIUM CHLORIDE: 900 INJECTION, SOLUTION INTRAVENOUS at 10:14

## 2020-11-05 RX ADMIN — PROPOFOL 50 MG: 10 INJECTION, EMULSION INTRAVENOUS at 10:51

## 2020-11-05 RX ADMIN — PROPOFOL 50 MG: 10 INJECTION, EMULSION INTRAVENOUS at 10:37

## 2020-11-05 NOTE — ROUTINE PROCESS
Paige Smith 1940 
[id number] Situation: 
Verbal report received from: Bisi Calles RN Procedure: Procedure(s): 
COLONOSCOPY   :- 
COLON BIOPSY Background: 
 
Preoperative diagnosis: BRIGHT RED BLOOD RECTAL BLEEDING Postoperative diagnosis: 1. Ascending Colon Colitis 2. Internal and External Hemorrhoids 3. Diverticulosis :  Dr. Isabella Melissa Assistant(s): Endoscopy Technician-1: Durga Alonzo Endoscopy RN-1: Liana Gilford, RN Specimens:  
ID Type Source Tests Collected by Time Destination 1 : Ascending Colon Biopsies Preservative   Anitra Green MD 11/5/2020 1051 Pathology H. Pylori  no Assessment:no: 
Intra-procedure medications Anesthesia gave intra-procedure sedation and medications, see anesthesia flow sheet yes Intravenous fluids: NS@ Joseluis Cooper Vital signs stable Abdominal assessment: round and soft Recommendation: 
Discharge patient per MD order. Family or Friend Permission to share finding with family or friend yes

## 2020-11-05 NOTE — H&P
[address], [address] [place] [address]      History and Physical       NAME:  Kalina Crain   :   1940   MRN:   [id number]             History of Present Illness:  Patient is a 78 y.o. who is seen for LLQ pain and rectal bleeding. 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 Home Medications:  Prior to Admission Medications   Prescriptions Last Dose Informant Patient Reported? Taking? BIOTIN PO 2020 at Unknown time  Yes Yes   Sig: Take 5,000 mcg by mouth two (2) times a day. VIT C/VIT E/LUTEIN/MIN/OMEGA-3 (OCUVITE PO) 2020 at Unknown time  Yes Yes   Sig: Take 1 Tab by mouth two (2) times a day. Takes one po twice daily. acetaminophen (TYLENOL) 325 mg tablet 11/3/2020  No No   Sig: Take 2 Tabs by mouth every four (4) hours as needed for Pain.    aspirin 81 mg tablet 10/29/2020 at Unknown time  Yes Yes   Sig: Take 81 mg by mouth daily. calcium-cholecalciferol, D3, (CALTRATE 600+D) tablet 11/4/2020 at Unknown time  Yes Yes   Sig: Take 1 Tab by mouth daily. finasteride (PROSCAR) 5 mg tablet 11/4/2020 at Unknown time  Yes Yes   Sig: Take 5 mg by mouth daily. hydroCHLOROthiazide (HYDRODIURIL) 25 mg tablet 11/4/2020 at Unknown time  Yes Yes   Sig: Take 25 mg by mouth daily. magnesium 250 mg tab 11/3/2020 at Unknown time  Yes Yes   Sig: Take 250 mg by mouth daily. metoprolol succinate (TOPROL-XL) 100 mg tablet 11/4/2020 at Unknown time  Yes Yes   Sig: Take 100 mg by mouth daily. rosuvastatin (CRESTOR) 20 mg tablet 11/3/2020 at Unknown time  Yes Yes   Sig: Take 20 mg by mouth daily.         Facility-Administered Medications: None       Hospital Medications:  Current Facility-Administered Medications   Medication Dose Route Frequency    0.9% sodium chloride infusion  50 mL/hr IntraVENous CONTINUOUS    sodium chloride (NS) flush 5-40 mL  5-40 mL IntraVENous Q8H    sodium chloride (NS) flush 5-40 mL  5-40 mL IntraVENous PRN    midazolam (VERSED) injection 0.25-5 mg  0.25-5 mg IntraVENous Multiple    fentaNYL citrate (PF) injection  mcg   mcg IntraVENous Multiple    naloxone (NARCAN) injection 0.4 mg  0.4 mg IntraVENous Multiple    flumazeniL (ROMAZICON) 0.1 mg/mL injection 0.2 mg  0.2 mg IntraVENous Multiple    simethicone (MYLICON) 75AV/9.9WT oral drops 80 mg  1.2 mL Oral Multiple    atropine injection 0.5 mg  0.5 mg IntraVENous ONCE PRN    EPINEPHrine (ADRENALIN) 0.1 mg/mL syringe 1 mg  1 mg Endoscopically ONCE PRN       Social History:  Social History     Tobacco Use    Smoking status: Never Smoker    Smokeless tobacco: Never Used   Substance Use Topics    Alcohol use: Yes     Comment: very seldom       Family History:  Family History   Problem Relation Age of Onset    Breast Cancer Mother     Heart Disease Father              Review of Systems:      Constitutional: negative fever, negative chills, negative weight loss  Eyes:   negative visual changes  ENT:   negative sore throat, tongue or lip swelling  Respiratory:  negative cough, negative dyspnea  Cards:  negative for chest pain, palpitations, lower extremity edema  GI:   See HPI  :  negative for frequency, dysuria  Integument:  negative for rash and pruritus  Heme:  negative for easy bruising and gum/nose bleeding  Musculoskel: negative for myalgias,  back pain and muscle weakness  Neuro: negative for headaches, dizziness, vertigo  Psych:  negative for feelings of anxiety, depression       Objective:     Patient Vitals for the past 8 hrs:   BP Temp Pulse Resp SpO2 Height Weight   11/05/20 0918      5' 2\" (1.575 m) 81.6 kg (180 lb)   11/05/20 0912 (!) 143/99 98.7 °F (37.1 °C) (!) 103 18 100 %       No intake/output data recorded. No intake/output data recorded. EXAM:     NEURO-a&o   HEENT-wnl   LUNGS-clear    COR-regular rate and rhythym     ABD-soft , no tenderness, no rebound, good bs     EXT-no edema     Data Review     No results for input(s): WBC, HGB, HCT, PLT, HGBEXT, HCTEXT, PLTEXT in the last 72 hours. No results for input(s): NA, K, CL, CO2, BUN, CREA, GLU, PHOS, CA in the last 72 hours. No results for input(s): AP, TBIL, TP, ALB, GLOB, GGT, AML, LPSE in the last 72 hours. No lab exists for component: SGOT, GPT, AMYP, HLPSE  No results for input(s): INR, PTP, APTT, INREXT in the last 72 hours. Assessment:     · LLQ pain  · Rectal bleeding     Patient Active Problem List   Diagnosis Code    Rectal bleeding K62.5    Colitis K52.9    Hypotension due to medication I95.2    Hyperlipidemia E78.5    Hx of essential hypertension Z86.79    Abdominal pain R10.9     Plan:   · The patient was counseled at length about the risks of alison Covid-19 in the sumi-operative and post-operative states including the recovery window of their procedure.   The patient was made aware that alison Covid-19 after a surgical procedure may worsen their prognosis for recovering from the virus and lend to a higher morbidity and or mortality risk. The patient was given the options of postponing their procedure. All of the risks, benefits, and alternatives were discussed. The patient does wish to proceed with the procedure. · Endoscopic procedure with MAC     Signed By: Joy Funes.  Armando Lee MD     11/5/2020  10:15 AM

## 2020-11-05 NOTE — DISCHARGE INSTRUCTIONS
[address]  [id number]  1940    Discomfort:  Redness at IV site- apply warm compress to area; if redness or soreness persist- contact your physician  There may be a slight amount of blood passed from the rectum  Gaseous discomfort- walking, belching will help relieve any discomfort  You may not operate a vehicle for 12 hours  You may not engage in an occupation involving machinery or appliances for rest of today  You may not drink alcoholic beverages for at least 12 hours  Avoid making any critical decisions for at least 24 hour  DIET:  You may resume your regular diet - however -  remember your colon is empty and a heavy meal will produce gas. Avoid these foods:  vegetables, fried / greasy foods, carbonated drinks     ACTIVITY:  You may  resume your normal daily activities it is recommended that you spend the remainder of the day resting -  avoid any strenuous activity. CALL M.D. ANY SIGN OF:   Increasing pain, nausea, vomiting  Abdominal distension (swelling)  New increased bleeding (oral or rectal)  Fever (chills)  Pain in chest area  Bloody discharge from nose or mouth  Shortness of breath      Follow-up Instructions:   Call Dr. Alyson Selby for any questions or problems at [phone]          ENDOSCOPY FINDINGS:   Your colonoscopy showed evidence of colitis in the ascending colon. Biopsies were taken. We will contact you about the results and the next step in the plan. Please maintain a high fiber diet. Telephone # [phone]      Signed By: Kati Garrison.  Honorio Palomares MD     11/5/2020  11:05 AM       DISCHARGE SUMMARY from Nurse    The following personal items collected during your admission are returned to you:   Dental Appliance: Dental Appliances: None  Vision: Visual Aid: None  Hearing Aid:    Jewelry:    Clothing:    Other Valuables:    Valuables sent to safe:      Learning About Coronavirus (COVID-19)  Coronavirus (COVID-19): Overview  What is coronavirus ([id number]-61)? The coronavirus disease (COVID-19) is caused by a virus. It is an illness that was first found in [place], [place], in December 2019. It has since spread worldwide. The virus can cause fever, cough, and trouble breathing. In severe cases, it can cause pneumonia and make it hard to breathe without help. It can cause death. Coronaviruses are a large group of viruses. They cause the common cold. They also cause more serious illnesses like Middle East respiratory syndrome (MERS) and severe acute respiratory syndrome (SARS). COVID-19 is caused by a novel coronavirus. That means it's a new type that has not been seen in people before. This virus spreads person-to-person through droplets from coughing and sneezing. It can also spread when you are close to someone who is infected. And it can spread when you touch something that has the virus on it, such as a doorknob or a tabletop. What can you do to protect yourself from coronavirus (COVID-19)? The best way to protect yourself from getting sick is to:  · Avoid areas where there is an outbreak. · Avoid contact with people who may be infected. · Wash your hands often with soap or alcohol-based hand sanitizers. · Avoid crowds and try to stay at least 6 feet away from other people. · Wash your hands often, especially after you cough or sneeze. Use soap and water, and scrub for at least 20 seconds. If soap and water aren't available, use an alcohol-based hand . · Avoid touching your mouth, nose, and eyes. What can you do to avoid spreading the virus to others? To help avoid spreading the virus to others:  · Cover your mouth with a tissue when you cough or sneeze. Then throw the tissue in the trash. · Use a disinfectant to clean things that you touch often. · Stay home if you are sick or have been exposed to the virus. Don't go to school, work, or public areas.  And don't use public transportation. · If you are sick:  ? Leave your home only if you need to get medical care. But call the doctor's office first so they know you're coming. And wear a face mask, if you have one.  ? If you have a face mask, wear it whenever you're around other people. It can help stop the spread of the virus when you cough or sneeze. ? Clean and disinfect your home every day. Use household  and disinfectant wipes or sprays. Take special care to clean things that you grab with your hands. These include doorknobs, remote controls, phones, and handles on your refrigerator and microwave. And don't forget countertops, tabletops, bathrooms, and computer keyboards. When to call for help  Call 911 anytime you think you may need emergency care. For example, call if:  · You have severe trouble breathing. (You can't talk at all.)  · You have constant chest pain or pressure. · You are severely dizzy or lightheaded. · You are confused or can't think clearly. · Your face and lips have a blue color. · You pass out (lose consciousness) or are very hard to wake up. Call your doctor now if you develop symptoms such as:  · Shortness of breath. · Fever. · Cough. If you need to get care, call ahead to the doctor's office for instructions before you go. Make sure you wear a face mask, if you have one, to prevent exposing other people to the virus. Where can you get the latest information? The following health organizations are tracking and studying this virus. Their websites contain the most up-to-date information. Massimo Cardenas also learn what to do if you think you may have been exposed to the virus. · U.S. Centers for Disease Control and Prevention (CDC): The CDC provides updated news about the disease and travel advice. The website also tells you how to prevent the spread of infection. www.cdc.gov  · World Health Organization [facility]): WHO offers information about the virus outbreaks. WHO also has travel advice. www.who.int  Current as of: April 1, 2020               Content Version: 12.4  © 2115-2016 Healthwise, [employer]. Care instructions adapted under license by your healthcare professional. If you have questions about a medical condition or this instruction, always ask your healthcare professional. [address] any warranty or liability for your use of this information. Patient Education        Diverticulosis: Care Instructions  Your Care Instructions  In diverticulosis, pouches called diverticula form in the wall of the large intestine (colon). The pouches do not cause any pain or other symptoms. Most people who have diverticulosis do not know they have it. But the pouches sometimes bleed, and if they become infected, they can cause pain and other symptoms. When this happens, it is called diverticulitis. Diverticula form when pressure pushes the wall of the colon outward at certain weak points. A diet that is too low in fiber can cause diverticula. Follow-up care is a key part of your treatment and safety. Be sure to make and go to all appointments, and call your doctor if you are having problems. It's also a good idea to know your test results and keep a list of the medicines you take. How can you care for yourself at home? · Include fruits, leafy green vegetables, beans, and whole grains in your diet each day. These foods are high in fiber. · Take a fiber supplement, such as Citrucel or Metamucil, every day if needed. Read and follow all instructions on the label. · Drink plenty of fluids, enough so that your urine is light yellow or clear like water. If you have kidney, heart, or liver disease and have to limit fluids, talk with your doctor before you increase the amount of fluids you drink. · Get at least 30 minutes of exercise on most days of the week. Walking is a good choice.  You also may want to do other activities, such as running, swimming, cycling, or playing tennis or team sports. · Cut out foods that cause gas, pain, or other symptoms. When should you call for help? Call your doctor now or seek immediate medical care if:    · You have belly pain.     · You pass maroon or very bloody stools.     · You have a fever.     · You have nausea and vomiting.     · You have unusual changes in your bowel movements or abdominal swelling.     · You have burning pain when you urinate.     · You have abnormal vaginal discharge.     · You have shoulder pain.     · You have cramping pain that does not get better when you have a bowel movement or pass gas.     · You pass gas or stool from your urethra while urinating. Watch closely for changes in your health, and be sure to contact your doctor if you have any problems. Where can you learn more? Go to http://jacy-duglas.info/  Enter [id number] in the search box to learn more about \"Diverticulosis: Care Instructions. \"  Current as of: April 15, 2020               Content Version: 12.6  © 2006-2020 [employer]. Care instructions adapted under license by [employer] (which disclaims liability or warranty for this information). If you have questions about a medical condition or this instruction, always ask your healthcare professional. [place] [address] any warranty or liability for your use of this information.

## 2020-11-05 NOTE — PROCEDURES
[address]  [address] [place] [address]      Colonoscopy Operative Report    Ellen Jones  [id number]  1940      Procedure Type:   Colonoscopy with biopsy     Indications:  Rectal bleeding, LLQ pain    Pre-operative Diagnosis: see indication above    Post-operative Diagnosis:  See findings below    :  Samantha Rojas. Rianna Odom MD      Referring Provider: Rajan Huerta MD      Sedation:  MAC anesthesia Propofol      Procedure Details:  After informed consent was obtained with all risks and benefits of procedure explained and preoperative exam completed, the patient was taken to the endoscopy suite and placed in the left lateral decubitus position. Upon sequential sedation as per above, a digital rectal exam was performed demonstrating internal hemorrhoids. The Olympus pediatric videocolonoscope  was inserted in the rectum and carefully advanced to the terminal ileum. The cecum was identified by the ileocecal valve and appendiceal orifice. The quality of preparation was good. The colonoscope was slowly withdrawn with careful evaluation between folds. Retroflexion in the rectum was completed . Findings:   Rectum: normal  Sigmoid: mild diverticulosis  Descending Colon: normal  Transverse Colon: normal  Ascending Colon: proximal ascending colon with erythema and punctate ulcerations - cold biopsies taken. Cecum: normal  Terminal Ileum: normal    The colon was tortuous and redundant and required abdominal pressure to reach the cecum. Specimen Removed: 1. Ascending colon biopsy    Complications: None. EBL:  None. Impression:      1. Segmental colitis of the proximal ascending colon - biopsied  2. Sigmoid diverticulosis  3. Internal hemorrhoids    Recommendations:  1. Follow up surgical pathology  2. High fiber diet education  3. Avoid non-essential NSAID's  4. Role and timing of repeat colonoscopy to be determined.  As no polyps were found today and patient is 78, she does not need repeat colonoscopy for screening purposes. Signed By: Tiffanie Shepard.  Corinne Ogles, MD     11/5/2020  11:08 AM

## 2020-11-05 NOTE — PROGRESS NOTES

## 2020-11-05 NOTE — ANESTHESIA PREPROCEDURE EVALUATION
Anesthetic History   No history of anesthetic complications            Review of Systems / Medical History  Patient summary reviewed, nursing notes reviewed and pertinent labs reviewed    Pulmonary  Within defined limits                 Neuro/Psych   Within defined limits           Cardiovascular  Within defined limits  Hypertension                   GI/Hepatic/Renal  Within defined limits              Endo/Other  Within defined limits  Diabetes    Obesity     Other Findings            Physical Exam    Airway  Mallampati: II  TM Distance: > 6 cm  Neck ROM: normal range of motion   Mouth opening: Normal     Cardiovascular  Regular rate and rhythm,  S1 and S2 normal,  no murmur, click, rub, or gallop             Dental  No notable dental hx       Pulmonary  Breath sounds clear to auscultation               Abdominal  GI exam deferred       Other Findings            Anesthetic Plan    ASA: 2  Anesthesia type: MAC          Induction: Intravenous  Anesthetic plan and risks discussed with: Patient

## 2020-11-05 NOTE — ANESTHESIA POSTPROCEDURE EVALUATION
Post-Anesthesia Evaluation and Assessment    Patient: Ron Harrison MRN: [id number]  SSN: [id number]    YOB: 1940  Age: 78 y.o. Sex: female      I have evaluated the patient and they are stable and ready for discharge from the PACU. Cardiovascular Function/Vital Signs  Visit Vitals  BP (!) 126/58   Pulse 97   Temp 37.1 °C (98.7 °F)   Resp 13   Ht 5' 2\" (1.575 m)   Wt 81.6 kg (180 lb)   SpO2 95%   BMI 32.92 kg/m²       Patient is status post MAC anesthesia for Procedure(s):  COLONOSCOPY   :-  COLON BIOPSY. Nausea/Vomiting: None    Postoperative hydration reviewed and adequate. Pain:  Pain Scale 1: Adult Nonverbal Pain Scale (11/05/20 1100)  Pain Intensity 1: 0 (11/05/20 1100)   Managed    Neurological Status: At baseline    Mental Status, Level of Consciousness: Alert and  oriented to person, place, and time    Pulmonary Status:   O2 Device: Room air (11/05/20 1100)   Adequate oxygenation and airway patent    Complications related to anesthesia: None    Post-anesthesia assessment completed. No concerns    Signed By: Vicky Orantes MD     November 5, 2020              Procedure(s):  COLONOSCOPY   :-  COLON BIOPSY. MAC    <BSHSIANPOST>    INITIAL Post-op Vital signs:   Vitals Value Taken Time   /63 11/5/2020 11:06 AM   Temp     Pulse 95 11/5/2020 11:08 AM   Resp 0 11/5/2020 11:08 AM   SpO2 96 % 11/5/2020 11:08 AM   Vitals shown include unvalidated device data.

## 2020-11-12 ENCOUNTER — TRANSCRIBE ORDER (OUTPATIENT)
Dept: SCHEDULING | Age: 80
End: 2020-11-12

## 2020-11-12 DIAGNOSIS — K52.9 COLITIS: Primary | ICD-10-CM

## 2020-11-23 ENCOUNTER — HOSPITAL ENCOUNTER (OUTPATIENT)
Dept: CT IMAGING | Age: 80
Discharge: HOME | End: 2020-11-23
Attending: INTERNAL MEDICINE
Payer: MEDICARE

## 2020-11-23 DIAGNOSIS — K52.9 COLITIS: ICD-10-CM

## 2020-11-23 PROCEDURE — 74011000636 HC RX REV CODE- 636: Performed by: RADIOLOGY

## 2020-11-23 PROCEDURE — 74174 CTA ABD&PLVS W/CONTRAST: CPT

## 2020-11-23 RX ORDER — SODIUM CHLORIDE 0.9 % (FLUSH) 0.9 %
10 SYRINGE (ML) INJECTION
Status: DISCONTINUED | OUTPATIENT
Start: 2020-11-23 | End: 2020-11-24 | Stop reason: HOSPADM

## 2020-11-23 RX ADMIN — IOPAMIDOL 100 ML: 755 INJECTION, SOLUTION INTRAVENOUS at 16:21

## 2022-03-18 PROBLEM — Z86.79 HX OF ESSENTIAL HYPERTENSION: Status: ACTIVE | Noted: 2019-06-10

## 2022-03-18 PROBLEM — R10.9 ABDOMINAL PAIN: Status: ACTIVE | Noted: 2020-08-17

## 2022-03-19 PROBLEM — K52.9 COLITIS: Status: ACTIVE | Noted: 2019-06-07

## 2022-03-19 PROBLEM — I95.2 HYPOTENSION DUE TO MEDICATION: Status: ACTIVE | Noted: 2019-06-10

## 2022-03-19 PROBLEM — E78.5 HYPERLIPIDEMIA: Status: ACTIVE | Noted: 2019-06-10

## 2023-01-20 ENCOUNTER — TRANSCRIBE ORDER (OUTPATIENT)
Dept: SCHEDULING | Age: 83
End: 2023-01-20

## 2023-01-20 DIAGNOSIS — R42 VERTIGO: Primary | ICD-10-CM

## 2023-01-20 DIAGNOSIS — C02.3 MALIGNANT NEOPLASM OF LATERAL MARGIN OF ANTERIOR TWO-THIRDS OF TONGUE (HCC): ICD-10-CM

## 2023-01-23 ENCOUNTER — APPOINTMENT (OUTPATIENT)
Dept: CT IMAGING | Age: 83
End: 2023-01-23
Attending: EMERGENCY MEDICINE
Payer: MEDICARE

## 2023-01-23 ENCOUNTER — HOSPITAL ENCOUNTER (EMERGENCY)
Age: 83
Discharge: HOME | End: 2023-01-23
Attending: EMERGENCY MEDICINE | Admitting: EMERGENCY MEDICINE
Payer: MEDICARE

## 2023-01-23 VITALS
HEIGHT: 62 IN | DIASTOLIC BLOOD PRESSURE: 100 MMHG | RESPIRATION RATE: 18 BRPM | SYSTOLIC BLOOD PRESSURE: 149 MMHG | TEMPERATURE: 98.1 F | OXYGEN SATURATION: 100 % | WEIGHT: 171 LBS | BODY MASS INDEX: 31.47 KG/M2 | HEART RATE: 72 BPM

## 2023-01-23 DIAGNOSIS — H81.09 MENIERE'S DISEASE, UNSPECIFIED LATERALITY: ICD-10-CM

## 2023-01-23 DIAGNOSIS — H81.11 BPPV (BENIGN PAROXYSMAL POSITIONAL VERTIGO), RIGHT: Primary | ICD-10-CM

## 2023-01-23 LAB
ALBUMIN SERPL-MCNC: 3.8 G/DL (ref 3.5–5)
ALBUMIN/GLOB SERPL: 1 (ref 1.1–2.2)
ALP SERPL-CCNC: 105 U/L (ref 45–117)
ALT SERPL-CCNC: 30 U/L (ref 12–78)
ANION GAP SERPL CALC-SCNC: 9 MMOL/L (ref 5–15)
AST SERPL-CCNC: 17 U/L (ref 15–37)
ATRIAL RATE: 75 BPM
BASOPHILS # BLD: 0.1 K/UL (ref 0–0.1)
BASOPHILS NFR BLD: 1 % (ref 0–1)
BILIRUB SERPL-MCNC: 0.3 MG/DL (ref 0.2–1)
BUN SERPL-MCNC: 25 MG/DL (ref 6–20)
BUN/CREAT SERPL: 30 (ref 12–20)
CALCIUM SERPL-MCNC: 9.6 MG/DL (ref 8.5–10.1)
CALCULATED P AXIS, ECG09: 66 DEGREES
CALCULATED R AXIS, ECG10: 49 DEGREES
CALCULATED T AXIS, ECG11: 65 DEGREES
CHLORIDE SERPL-SCNC: 102 MMOL/L (ref 97–108)
CO2 SERPL-SCNC: 25 MMOL/L (ref 21–32)
COMMENT, HOLDF: NORMAL
CREAT SERPL-MCNC: 0.84 MG/DL (ref 0.55–1.02)
DIAGNOSIS, 93000: NORMAL
DIFFERENTIAL METHOD BLD: NORMAL
EOSINOPHIL # BLD: 0.4 K/UL (ref 0–0.4)
EOSINOPHIL NFR BLD: 5 % (ref 0–7)
ERYTHROCYTE [DISTWIDTH] IN BLOOD BY AUTOMATED COUNT: 14.4 % (ref 11.5–14.5)
GLOBULIN SER CALC-MCNC: 3.8 G/DL (ref 2–4)
GLUCOSE SERPL-MCNC: 111 MG/DL (ref 65–100)
HCT VFR BLD AUTO: 40.3 % (ref 35–47)
HGB BLD-MCNC: 13.1 G/DL (ref 11.5–16)
IMM GRANULOCYTES # BLD AUTO: 0 K/UL (ref 0–0.04)
IMM GRANULOCYTES NFR BLD AUTO: 0 % (ref 0–0.5)
LYMPHOCYTES # BLD: 2.5 K/UL (ref 0.8–3.5)
LYMPHOCYTES NFR BLD: 30 % (ref 12–49)
MCH RBC QN AUTO: 27.9 PG (ref 26–34)
MCHC RBC AUTO-ENTMCNC: 32.5 G/DL (ref 30–36.5)
MCV RBC AUTO: 85.7 FL (ref 80–99)
MONOCYTES # BLD: 0.6 K/UL (ref 0–1)
MONOCYTES NFR BLD: 7 % (ref 5–13)
NEUTS SEG # BLD: 4.8 K/UL (ref 1.8–8)
NEUTS SEG NFR BLD: 57 % (ref 32–75)
NRBC # BLD: 0 K/UL (ref 0–0.01)
NRBC BLD-RTO: 0 PER 100 WBC
P-R INTERVAL, ECG05: 218 MS
PLATELET # BLD AUTO: 275 K/UL (ref 150–400)
PMV BLD AUTO: 9.6 FL (ref 8.9–12.9)
POTASSIUM SERPL-SCNC: 3.5 MMOL/L (ref 3.5–5.1)
PROT SERPL-MCNC: 7.6 G/DL (ref 6.4–8.2)
Q-T INTERVAL, ECG07: 384 MS
QRS DURATION, ECG06: 74 MS
QTC CALCULATION (BEZET), ECG08: 428 MS
RBC # BLD AUTO: 4.7 M/UL (ref 3.8–5.2)
SAMPLES BEING HELD,HOLD: NORMAL
SODIUM SERPL-SCNC: 136 MMOL/L (ref 136–145)
TROPONIN-HIGH SENSITIVITY: 5 NG/L (ref 0–51)
VENTRICULAR RATE, ECG03: 75 BPM
WBC # BLD AUTO: 8.4 K/UL (ref 3.6–11)

## 2023-01-23 PROCEDURE — 36415 COLL VENOUS BLD VENIPUNCTURE: CPT

## 2023-01-23 PROCEDURE — 99284 EMERGENCY DEPT VISIT MOD MDM: CPT | Performed by: EMERGENCY MEDICINE

## 2023-01-23 PROCEDURE — 84484 ASSAY OF TROPONIN QUANT: CPT

## 2023-01-23 PROCEDURE — 85025 COMPLETE CBC W/AUTO DIFF WBC: CPT

## 2023-01-23 PROCEDURE — 70450 CT HEAD/BRAIN W/O DYE: CPT

## 2023-01-23 PROCEDURE — 80053 COMPREHEN METABOLIC PANEL: CPT

## 2023-01-23 PROCEDURE — 93005 ELECTROCARDIOGRAM TRACING: CPT

## 2023-01-23 RX ORDER — MECLIZINE HYDROCHLORIDE 25 MG/1
25-50 TABLET ORAL
Qty: 30 TABLET | Refills: 0 | Status: SHIPPED | OUTPATIENT
Start: 2023-01-23 | End: 2023-02-02

## 2023-01-23 RX ORDER — DIAZEPAM 5 MG/1
5 TABLET ORAL
Qty: 10 TABLET | Refills: 0 | Status: SHIPPED | OUTPATIENT
Start: 2023-01-23

## 2023-01-23 NOTE — ED TRIAGE NOTES
Pt c/o \"Meniere's disease\", dizziness since November , pt hyperventilating and rambling in triage, denies n/v, denies vision or speech difficulty, no blood thinners, these attacks occur when her head is bent down , denies numbness/tingling to face, arms or legs

## 2023-01-24 ENCOUNTER — TRANSCRIBE ORDER (OUTPATIENT)
Dept: SCHEDULING | Age: 83
End: 2023-01-24

## 2023-01-24 DIAGNOSIS — R42 VERTIGO: Primary | ICD-10-CM

## 2023-01-25 ENCOUNTER — HOSPITAL ENCOUNTER (OUTPATIENT)
Dept: MRI IMAGING | Age: 83
Discharge: HOME | End: 2023-01-25
Attending: INTERNAL MEDICINE
Payer: MEDICARE

## 2023-01-25 DIAGNOSIS — R42 VERTIGO: ICD-10-CM

## 2023-01-25 PROCEDURE — 70553 MRI BRAIN STEM W/O & W/DYE: CPT

## 2023-01-25 PROCEDURE — A9576 INJ PROHANCE MULTIPACK: HCPCS

## 2023-01-25 PROCEDURE — 74011250636 HC RX REV CODE- 250/636

## 2023-01-25 RX ADMIN — GADOTERIDOL 15 ML: 279.3 INJECTION, SOLUTION INTRAVENOUS at 15:42

## 2023-01-26 NOTE — ED PROVIDER NOTES
The history is provided by the patient. Dizziness  This is a recurrent problem. The current episode started more than 2 days ago. The problem has been resolved. There was no focality noted. Pertinent negatives include no focal weakness, no speech difficulty, no memory loss, no visual change and no mental status change. There has been no fever. Pertinent negatives include no confusion. Associated medical issues do not include CVA.       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       Past Surgical History:   Procedure Laterality Date    BREAST SURGERY PROCEDURE UNLISTED      cyst removed from left breast    COLONOSCOPY N/A 11/5/2020    COLONOSCOPY   :- performed by David Driscoll MD at [facility] ENDOSCOPY    HX APPENDECTOMY      HX BREAST BIOPSY Bilateral     x 6 - one cancerous    HX BREAST LUMPECTOMY      left    HX CATARACT REMOVAL      HX HEENT      T & A    HX OTHER SURGICAL      colonoscopy x 1 - normal per pt    HX VASCULAR ACCESS      port-a-cath in/out         Family History:   Problem Relation Age of Onset    Breast Cancer Mother     Heart Disease Father        Social History     Socioeconomic History    Marital status:      Spouse name: Not on file    Number of children: Not on file    Years of education: Not on file    Highest education level: Not on file   Occupational History    Not on file   Tobacco Use    Smoking status: Never    Smokeless tobacco: Never   Substance and Sexual Activity    Alcohol use: Yes     Comment: very seldom    Drug use: Not on file    Sexual activity: Not on file   Other Topics Concern    Not on file   Social History Narrative    Not on file     Social Determinants of Health     Financial Resource Strain: Not on file   Food Insecurity: Not on file   Transportation Needs: Not on file   Physical Activity: Not on file   Stress: Not on file   Social Connections: Not on file   Intimate Partner Violence: Not on file   Housing Stability: Not on file         ALLERGIES: Fosamax [alendronate]    Review of Systems   Neurological:  Positive for dizziness. Negative for focal weakness and speech difficulty. Psychiatric/Behavioral:  Negative for confusion and memory loss. Vitals:    01/23/23 1312 01/23/23 1607 01/23/23 1608   BP: (!) 169/83 (!) 149/100    Pulse: 82 72    Resp: 16 18    Temp: 96.9 °F (36.1 °C) 98.1 °F (36.7 °C)    SpO2: 97% 100%    Weight:   77.6 kg (171 lb)   Height:   5' 2\" (1.575 m)            Physical Exam  Vitals and nursing note reviewed. Constitutional:       General: She is not in acute distress. Appearance: She is well-developed. HENT:      Head: Normocephalic and atraumatic. Eyes:      Conjunctiva/sclera: Conjunctivae normal.   Cardiovascular:      Rate and Rhythm: Normal rate and regular rhythm. Pulmonary:      Effort: Pulmonary effort is normal. No respiratory distress. Abdominal:      General: There is no distension. Musculoskeletal:         General: No deformity. Normal range of motion. Cervical back: Neck supple. Skin:     General: Skin is warm and dry. Neurological:      Mental Status: She is alert. Cranial Nerves: No cranial nerve deficit. Comments: Negative head impulse, nystagmus 2 beats rightward gaze, normal test of skew.     Psychiatric:         Mood and Affect: Mood normal.         Behavior: Behavior normal.        Medical Decision Making  78 y.o. female presents with typical benign paroxysmal peripheral vertigo symptoms that have mostly occurred while looking down or reading and are self limiting. No central features suggested by exam or history. Ct performed to rule out posterior circulation stroke and is >48 hours since onset. No mass effect or ischemia noted. She has an upcoming outpatient MRI for her meniere's and I feel this is appropriate. I do suspect her vertigo today is from otolith due to positional nature. No significant hematologic or metabolic abnormalities to explain symptoms. No signs of atypical ACS. Provided instructions for supportive care measures. Discussed medication management and Epley maneuvers for home care. Plan to follow up with PCP as needed and return precautions discussed for worsening or new concerning symptoms. Problems Addressed:  BPPV (benign paroxysmal positional vertigo), right: acute illness or injury  Meniere's disease, unspecified laterality: chronic illness or injury with exacerbation, progression, or side effects of treatment    Amount and/or Complexity of Data Reviewed  Independent Historian:      Details: adult children  Labs: ordered. Decision-making details documented in ED Course. Radiology: ordered and independent interpretation performed. Decision-making details documented in ED Course. ECG/medicine tests: ordered and independent interpretation performed. Decision-making details documented in ED Course. Risk  Prescription drug management. ED Course as of 01/26/23 1148   Mon Jan 23, 2023   1327 EKG 1317: Rate 75, Normal sinus rhythm with 1st degree AV block, No ST segment or T wave abnormalities. Normal EKG.     [DK]      ED Course User Index  [DK] Lexy Melara MD       Procedures

## 2024-05-14 LAB
ALBUMIN SERPL-MCNC: 3.8 G/DL (ref 3.5–5)
ALBUMIN/GLOB SERPL: 1.2 (ref 1.1–2.2)
ALP SERPL-CCNC: 91 U/L (ref 45–117)
ALT SERPL-CCNC: 31 U/L (ref 12–78)
ANION GAP SERPL CALC-SCNC: 5 MMOL/L (ref 5–15)
AST SERPL-CCNC: 20 U/L (ref 15–37)
BILIRUB SERPL-MCNC: 0.4 MG/DL (ref 0.2–1)
BUN SERPL-MCNC: 26 MG/DL (ref 6–20)
BUN/CREAT SERPL: 29 (ref 12–20)
CALCIUM SERPL-MCNC: 9.7 MG/DL (ref 8.5–10.1)
CHLORIDE SERPL-SCNC: 99 MMOL/L (ref 97–108)
CO2 SERPL-SCNC: 28 MMOL/L (ref 21–32)
CREAT SERPL-MCNC: 0.89 MG/DL (ref 0.55–1.02)
GLOBULIN SER CALC-MCNC: 3.3 G/DL (ref 2–4)
GLUCOSE SERPL-MCNC: 114 MG/DL (ref 65–100)
MAGNESIUM SERPL-MCNC: 2 MG/DL (ref 1.6–2.4)
POTASSIUM SERPL-SCNC: 4.1 MMOL/L (ref 3.5–5.1)
PROT SERPL-MCNC: 7.1 G/DL (ref 6.4–8.2)
SODIUM SERPL-SCNC: 132 MMOL/L (ref 136–145)

## 2024-05-15 LAB — AMMONIA PLAS-SCNC: 36 UMOL/L

## 2024-06-25 ENCOUNTER — HOSPITAL ENCOUNTER (OUTPATIENT)
Facility: HOSPITAL | Age: 84
Discharge: HOME | End: 2024-06-28
Attending: INTERNAL MEDICINE
Payer: MEDICARE

## 2024-06-25 DIAGNOSIS — L70.9 SAPHO SYNDROME (HCC): ICD-10-CM

## 2024-06-25 DIAGNOSIS — C50.512 MALIGNANT NEOPLASM OF LOWER-OUTER QUADRANT OF LEFT FEMALE BREAST, UNSPECIFIED ESTROGEN RECEPTOR STATUS (HCC): ICD-10-CM

## 2024-06-25 DIAGNOSIS — L40.3 SAPHO SYNDROME (HCC): ICD-10-CM

## 2024-06-25 DIAGNOSIS — M85.80 SAPHO SYNDROME (HCC): ICD-10-CM

## 2024-06-25 DIAGNOSIS — M89.9 BONE DISEASE: ICD-10-CM

## 2024-06-25 DIAGNOSIS — Z78.0 MENOPAUSE: ICD-10-CM

## 2024-06-25 DIAGNOSIS — M65.9 SAPHO SYNDROME (HCC): ICD-10-CM

## 2024-06-25 DIAGNOSIS — M86.9 SAPHO SYNDROME (HCC): ICD-10-CM

## 2024-06-25 PROCEDURE — 77080 DXA BONE DENSITY AXIAL: CPT

## 2025-06-30 ENCOUNTER — CARE COORDINATION (OUTPATIENT)
Dept: CARE COORDINATION | Age: 85
End: 2025-06-30

## 2025-07-01 NOTE — CARE COORDINATION
SECURE email notification sent to identified IDT members at   [facility]

## (undated) DEVICE — BAG BELONG PT PERS CLEAR HANDL

## (undated) DEVICE — SET ADMIN 16ML TBNG L100IN 2 Y INJ SITE IV PIGGY BK DISP

## (undated) DEVICE — SOLIDIFIER FLUID 3000 CC ABSORB

## (undated) DEVICE — CANN NASAL O2 CAPNOGRAPHY AD -- FILTERLINE

## (undated) DEVICE — KENDALL RADIOLUCENT FOAM MONITORING ELECTRODE -RECTANGULAR SHAPE: Brand: KENDALL

## (undated) DEVICE — SYRINGE MED 20ML STD CLR PLAS LUERLOCK TIP N CTRL DISP

## (undated) DEVICE — BW-412T DISP COMBO CLEANING BRUSH: Brand: SINGLE USE COMBINATION CLEANING BRUSH

## (undated) DEVICE — ENDO CARRY-ON PROCEDURE KIT INCLUDES ENZYMATIC SPONGE, GAUZE, BIOHAZARD LABEL, TRAY, LUBRICANT, DIRTY SCOPE LABEL, WATER LABEL, TRAY, DRAWSTRING PAD, AND DEFENDO 4-PIECE KIT.: Brand: ENDO CARRY-ON PROCEDURE KIT

## (undated) DEVICE — CATH IV AUTOGRD BC BLU 22GA 25 -- INSYTE

## (undated) DEVICE — NEEDLE HYPO 18GA L1.5IN PNK S STL HUB POLYPR SHLD REG BVL

## (undated) DEVICE — CONTAINER SPEC 20 ML LID NEUT BUFF FORMALIN 10 % POLYPR STS

## (undated) DEVICE — FORCEPS BX L240CM JAW DIA2.8MM L CAP W/ NDL MIC MESH TOOTH

## (undated) DEVICE — BAG SPEC BIOHZD LF 2MIL 6X10IN -- CONVERT TO ITEM 357326

## (undated) DEVICE — 1200 GUARD II KIT W/5MM TUBE W/O VAC TUBE: Brand: GUARDIAN

## (undated) DEVICE — SET EXTN TBNG L BOR 4 W STPCOCK ST 32IN PRIMING VOL 6ML

## (undated) DEVICE — KIT IV STRT W CHLORAPREP PD 1ML